# Patient Record
Sex: FEMALE | Race: WHITE | Employment: FULL TIME | URBAN - METROPOLITAN AREA
[De-identification: names, ages, dates, MRNs, and addresses within clinical notes are randomized per-mention and may not be internally consistent; named-entity substitution may affect disease eponyms.]

---

## 2017-01-16 ENCOUNTER — OFFICE VISIT (OUTPATIENT)
Dept: FAMILY MEDICINE CLINIC | Age: 32
End: 2017-01-16

## 2017-01-16 VITALS
HEART RATE: 97 BPM | TEMPERATURE: 98.5 F | WEIGHT: 256 LBS | SYSTOLIC BLOOD PRESSURE: 134 MMHG | BODY MASS INDEX: 42.65 KG/M2 | HEIGHT: 65 IN | OXYGEN SATURATION: 98 % | DIASTOLIC BLOOD PRESSURE: 80 MMHG | RESPIRATION RATE: 16 BRPM

## 2017-01-16 DIAGNOSIS — Z01.818 PREOP GENERAL PHYSICAL EXAM: ICD-10-CM

## 2017-01-16 DIAGNOSIS — E55.9 VITAMIN D DEFICIENCY: ICD-10-CM

## 2017-01-16 DIAGNOSIS — K52.831 COLITIS, COLLAGENOUS: ICD-10-CM

## 2017-01-16 DIAGNOSIS — Z87.19 HISTORY OF PANCREATITIS: ICD-10-CM

## 2017-01-16 DIAGNOSIS — D25.9 UTERINE LEIOMYOMA, UNSPECIFIED LOCATION: Primary | ICD-10-CM

## 2017-01-16 LAB
A/G RATIO: 1.4 (ref 1.1–2.2)
ALBUMIN SERPL-MCNC: 3.8 G/DL (ref 3.4–5)
ALP BLD-CCNC: 71 U/L (ref 40–129)
ALT SERPL-CCNC: 10 U/L (ref 10–40)
ANION GAP SERPL CALCULATED.3IONS-SCNC: 14 MMOL/L (ref 3–16)
AST SERPL-CCNC: 13 U/L (ref 15–37)
BASOPHILS ABSOLUTE: 0.1 K/UL (ref 0–0.2)
BASOPHILS RELATIVE PERCENT: 0.9 %
BILIRUB SERPL-MCNC: 1 MG/DL (ref 0–1)
BUN BLDV-MCNC: 9 MG/DL (ref 7–20)
CALCIUM SERPL-MCNC: 9 MG/DL (ref 8.3–10.6)
CHLORIDE BLD-SCNC: 106 MMOL/L (ref 99–110)
CO2: 22 MMOL/L (ref 21–32)
CREAT SERPL-MCNC: 0.6 MG/DL (ref 0.6–1.1)
EOSINOPHILS ABSOLUTE: 0.1 K/UL (ref 0–0.6)
EOSINOPHILS RELATIVE PERCENT: 1.4 %
GFR AFRICAN AMERICAN: >60
GFR NON-AFRICAN AMERICAN: >60
GLOBULIN: 2.7 G/DL
GLUCOSE BLD-MCNC: 91 MG/DL (ref 70–99)
HCT VFR BLD CALC: 41.4 % (ref 36–48)
HEMOGLOBIN: 13.9 G/DL (ref 12–16)
LIPASE: 44 U/L (ref 13–60)
LYMPHOCYTES ABSOLUTE: 2.4 K/UL (ref 1–5.1)
LYMPHOCYTES RELATIVE PERCENT: 33.3 %
MCH RBC QN AUTO: 29.9 PG (ref 26–34)
MCHC RBC AUTO-ENTMCNC: 33.5 G/DL (ref 31–36)
MCV RBC AUTO: 89.2 FL (ref 80–100)
MONOCYTES ABSOLUTE: 0.5 K/UL (ref 0–1.3)
MONOCYTES RELATIVE PERCENT: 7.3 %
NEUTROPHILS ABSOLUTE: 4.1 K/UL (ref 1.7–7.7)
NEUTROPHILS RELATIVE PERCENT: 57.1 %
PDW BLD-RTO: 13.1 % (ref 12.4–15.4)
PLATELET # BLD: 260 K/UL (ref 135–450)
PMV BLD AUTO: 9.4 FL (ref 5–10.5)
POTASSIUM SERPL-SCNC: 3.9 MMOL/L (ref 3.5–5.1)
RBC # BLD: 4.64 M/UL (ref 4–5.2)
SODIUM BLD-SCNC: 142 MMOL/L (ref 136–145)
TOTAL PROTEIN: 6.5 G/DL (ref 6.4–8.2)
VITAMIN D 25-HYDROXY: 37.4 NG/ML
WBC # BLD: 7.2 K/UL (ref 4–11)

## 2017-01-16 PROCEDURE — 99242 OFF/OP CONSLTJ NEW/EST SF 20: CPT | Performed by: FAMILY MEDICINE

## 2017-01-16 PROCEDURE — 36415 COLL VENOUS BLD VENIPUNCTURE: CPT | Performed by: FAMILY MEDICINE

## 2017-01-16 RX ORDER — BUDESONIDE 3 MG/1
3 CAPSULE, COATED PELLETS ORAL DAILY
COMMUNITY
Start: 2017-01-01 | End: 2019-03-05 | Stop reason: ALTCHOICE

## 2017-01-16 RX ORDER — ACETAMINOPHEN 325 MG/1
650 TABLET ORAL EVERY 6 HOURS PRN
COMMUNITY
End: 2018-02-28 | Stop reason: ALTCHOICE

## 2017-02-01 ENCOUNTER — OFFICE VISIT (OUTPATIENT)
Dept: FAMILY MEDICINE CLINIC | Age: 32
End: 2017-02-01

## 2017-02-01 VITALS
WEIGHT: 259 LBS | RESPIRATION RATE: 16 BRPM | TEMPERATURE: 98.8 F | HEIGHT: 65 IN | HEART RATE: 133 BPM | OXYGEN SATURATION: 98 % | DIASTOLIC BLOOD PRESSURE: 80 MMHG | SYSTOLIC BLOOD PRESSURE: 132 MMHG | BODY MASS INDEX: 43.15 KG/M2

## 2017-02-01 DIAGNOSIS — R05.9 COUGH: Primary | ICD-10-CM

## 2017-02-01 PROCEDURE — 99212 OFFICE O/P EST SF 10 MIN: CPT | Performed by: NURSE PRACTITIONER

## 2017-02-01 RX ORDER — OXYCODONE HYDROCHLORIDE 5 MG/1
5-10 TABLET ORAL
COMMUNITY
Start: 2017-01-28 | End: 2018-02-28 | Stop reason: ALTCHOICE

## 2017-02-01 RX ORDER — TRAMADOL HYDROCHLORIDE 50 MG/1
50-100 TABLET ORAL
COMMUNITY
Start: 2017-01-28 | End: 2018-02-28 | Stop reason: ALTCHOICE

## 2017-02-01 ASSESSMENT — ENCOUNTER SYMPTOMS: COUGH: 1

## 2017-02-08 ENCOUNTER — OFFICE VISIT (OUTPATIENT)
Dept: FAMILY MEDICINE CLINIC | Age: 32
End: 2017-02-08

## 2017-02-08 VITALS
RESPIRATION RATE: 16 BRPM | OXYGEN SATURATION: 98 % | SYSTOLIC BLOOD PRESSURE: 132 MMHG | BODY MASS INDEX: 42.75 KG/M2 | HEART RATE: 102 BPM | WEIGHT: 256.6 LBS | DIASTOLIC BLOOD PRESSURE: 80 MMHG | HEIGHT: 65 IN | TEMPERATURE: 98.2 F

## 2017-02-08 DIAGNOSIS — J18.9 PNEUMONIA OF BOTH LOWER LOBES DUE TO INFECTIOUS ORGANISM: ICD-10-CM

## 2017-02-08 DIAGNOSIS — R30.0 DYSURIA: Primary | ICD-10-CM

## 2017-02-08 LAB
BILIRUBIN, POC: NEGATIVE
BLOOD URINE, POC: ABNORMAL
CLARITY, POC: ABNORMAL
COLOR, POC: ABNORMAL
GLUCOSE URINE, POC: NEGATIVE
KETONES, POC: NEGATIVE
LEUKOCYTE EST, POC: NEGATIVE
NITRITE, POC: NEGATIVE
PH, POC: 6.5
PROTEIN, POC: NEGATIVE
SPECIFIC GRAVITY, POC: 1.02
UROBILINOGEN, POC: 0.2

## 2017-02-08 PROCEDURE — 81002 URINALYSIS NONAUTO W/O SCOPE: CPT | Performed by: NURSE PRACTITIONER

## 2017-02-08 PROCEDURE — 99213 OFFICE O/P EST LOW 20 MIN: CPT | Performed by: NURSE PRACTITIONER

## 2017-02-10 LAB — URINE CULTURE, ROUTINE: NORMAL

## 2017-07-14 DIAGNOSIS — K52.831 COLITIS, COLLAGENOUS: ICD-10-CM

## 2017-07-14 DIAGNOSIS — E55.9 VITAMIN D DEFICIENCY: Primary | ICD-10-CM

## 2017-07-14 DIAGNOSIS — Z79.899 LONG TERM USE OF DRUG: ICD-10-CM

## 2017-07-17 ENCOUNTER — NURSE ONLY (OUTPATIENT)
Dept: FAMILY MEDICINE CLINIC | Age: 32
End: 2017-07-17

## 2017-07-17 DIAGNOSIS — Z79.899 LONG TERM USE OF DRUG: ICD-10-CM

## 2017-07-17 DIAGNOSIS — K52.831 COLITIS, COLLAGENOUS: ICD-10-CM

## 2017-07-17 DIAGNOSIS — E55.9 VITAMIN D DEFICIENCY: ICD-10-CM

## 2017-07-17 LAB
A/G RATIO: 1.4 (ref 1.1–2.2)
ALBUMIN SERPL-MCNC: 3.9 G/DL (ref 3.4–5)
ALP BLD-CCNC: 89 U/L (ref 40–129)
ALT SERPL-CCNC: 13 U/L (ref 10–40)
ANION GAP SERPL CALCULATED.3IONS-SCNC: 16 MMOL/L (ref 3–16)
AST SERPL-CCNC: 12 U/L (ref 15–37)
BASOPHILS ABSOLUTE: 0.1 K/UL (ref 0–0.2)
BASOPHILS RELATIVE PERCENT: 0.7 %
BILIRUB SERPL-MCNC: 0.6 MG/DL (ref 0–1)
BUN BLDV-MCNC: 10 MG/DL (ref 7–20)
CALCIUM SERPL-MCNC: 8.9 MG/DL (ref 8.3–10.6)
CHLORIDE BLD-SCNC: 102 MMOL/L (ref 99–110)
CO2: 22 MMOL/L (ref 21–32)
CREAT SERPL-MCNC: 0.6 MG/DL (ref 0.6–1.1)
EOSINOPHILS ABSOLUTE: 0.1 K/UL (ref 0–0.6)
EOSINOPHILS RELATIVE PERCENT: 1.2 %
GFR AFRICAN AMERICAN: >60
GFR NON-AFRICAN AMERICAN: >60
GLOBULIN: 2.8 G/DL
GLUCOSE BLD-MCNC: 107 MG/DL (ref 70–99)
HCT VFR BLD CALC: 39.9 % (ref 36–48)
HEMOGLOBIN: 13.2 G/DL (ref 12–16)
LYMPHOCYTES ABSOLUTE: 2.9 K/UL (ref 1–5.1)
LYMPHOCYTES RELATIVE PERCENT: 26.8 %
MCH RBC QN AUTO: 28.4 PG (ref 26–34)
MCHC RBC AUTO-ENTMCNC: 33 G/DL (ref 31–36)
MCV RBC AUTO: 86.1 FL (ref 80–100)
MONOCYTES ABSOLUTE: 0.5 K/UL (ref 0–1.3)
MONOCYTES RELATIVE PERCENT: 4.5 %
NEUTROPHILS ABSOLUTE: 7.1 K/UL (ref 1.7–7.7)
NEUTROPHILS RELATIVE PERCENT: 66.8 %
PDW BLD-RTO: 14.4 % (ref 12.4–15.4)
PLATELET # BLD: 299 K/UL (ref 135–450)
PMV BLD AUTO: 8.8 FL (ref 5–10.5)
POTASSIUM SERPL-SCNC: 3.7 MMOL/L (ref 3.5–5.1)
RBC # BLD: 4.64 M/UL (ref 4–5.2)
SODIUM BLD-SCNC: 140 MMOL/L (ref 136–145)
TOTAL PROTEIN: 6.7 G/DL (ref 6.4–8.2)
VITAMIN D 25-HYDROXY: 32.5 NG/ML
WBC # BLD: 10.7 K/UL (ref 4–11)

## 2017-07-17 PROCEDURE — 36415 COLL VENOUS BLD VENIPUNCTURE: CPT | Performed by: FAMILY MEDICINE

## 2017-11-22 ENCOUNTER — HOSPITAL ENCOUNTER (OUTPATIENT)
Dept: OTHER | Age: 32
Discharge: HOME | End: 2017-11-22
Attending: INTERNAL MEDICINE | Admitting: INTERNAL MEDICINE

## 2017-11-22 LAB
ANION GAP SERPL CALCULATED.3IONS-SCNC: 14 MMOL/L (ref 3–16)
BASOPHILS ABSOLUTE: 0.1 K/UL (ref 0–0.2)
BASOPHILS RELATIVE PERCENT: 0.5 %
BUN BLDV-MCNC: 7 MG/DL (ref 7–20)
CALCIUM SERPL-MCNC: 8.9 MG/DL (ref 8.3–10.6)
CHLORIDE BLD-SCNC: 102 MMOL/L (ref 99–110)
CO2: 22 MMOL/L (ref 21–32)
CREAT SERPL-MCNC: 0.6 MG/DL (ref 0.6–1.1)
EOSINOPHILS ABSOLUTE: 0.2 K/UL (ref 0–0.6)
EOSINOPHILS RELATIVE PERCENT: 1.9 %
GFR AFRICAN AMERICAN: >60
GFR NON-AFRICAN AMERICAN: >60
GLUCOSE BLD-MCNC: 90 MG/DL (ref 70–99)
HCT VFR BLD CALC: 39.1 % (ref 36–48)
HEMOGLOBIN: 13.1 G/DL (ref 12–16)
LYMPHOCYTES ABSOLUTE: 2.7 K/UL (ref 1–5.1)
LYMPHOCYTES RELATIVE PERCENT: 27.3 %
MCH RBC QN AUTO: 28.6 PG (ref 26–34)
MCHC RBC AUTO-ENTMCNC: 33.4 G/DL (ref 31–36)
MCV RBC AUTO: 85.5 FL (ref 80–100)
MONOCYTES ABSOLUTE: 0.7 K/UL (ref 0–1.3)
MONOCYTES RELATIVE PERCENT: 7 %
NEUTROPHILS ABSOLUTE: 6.2 K/UL (ref 1.7–7.7)
NEUTROPHILS RELATIVE PERCENT: 63.3 %
PDW BLD-RTO: 13.5 % (ref 12.4–15.4)
PLATELET # BLD: 298 K/UL (ref 135–450)
PMV BLD AUTO: 9.1 FL (ref 5–10.5)
POTASSIUM SERPL-SCNC: 3.5 MMOL/L (ref 3.5–5.1)
RBC # BLD: 4.57 M/UL (ref 4–5.2)
SODIUM BLD-SCNC: 138 MMOL/L (ref 136–145)
WBC # BLD: 9.8 K/UL (ref 4–11)

## 2017-11-25 ENCOUNTER — HOSPITAL ENCOUNTER (OUTPATIENT)
Dept: OTHER | Age: 32
Discharge: HOME | End: 2017-11-25
Attending: INTERNAL MEDICINE | Admitting: INTERNAL MEDICINE

## 2017-11-25 LAB — C DIFFICILE TOXIN, EIA: NORMAL

## 2017-11-26 LAB — GI BACTERIAL PATHOGENS BY PCR: NORMAL

## 2017-11-27 LAB
CRYPTOSPORIDIUM ANTIGEN STOOL: NORMAL
E HISTOLYTICA ANTIGEN STOOL: NORMAL
GIARDIA ANTIGEN STOOL: NORMAL

## 2017-11-28 ENCOUNTER — TELEPHONE (OUTPATIENT)
Dept: FAMILY MEDICINE CLINIC | Age: 32
End: 2017-11-28

## 2017-11-29 ENCOUNTER — OFFICE VISIT (OUTPATIENT)
Dept: FAMILY MEDICINE CLINIC | Age: 32
End: 2017-11-29

## 2017-11-29 VITALS
HEART RATE: 76 BPM | HEIGHT: 65 IN | WEIGHT: 281 LBS | DIASTOLIC BLOOD PRESSURE: 78 MMHG | RESPIRATION RATE: 16 BRPM | SYSTOLIC BLOOD PRESSURE: 124 MMHG | BODY MASS INDEX: 46.82 KG/M2

## 2017-11-29 DIAGNOSIS — R11.0 NAUSEA: ICD-10-CM

## 2017-11-29 DIAGNOSIS — Z20.2 EXPOSURE TO SEXUALLY TRANSMITTED DISEASE (STD): Primary | ICD-10-CM

## 2017-11-29 PROCEDURE — 99213 OFFICE O/P EST LOW 20 MIN: CPT | Performed by: FAMILY MEDICINE

## 2017-11-29 RX ORDER — MIRTAZAPINE 15 MG/1
TABLET, FILM COATED ORAL
COMMUNITY
Start: 2017-11-16 | End: 2019-03-05 | Stop reason: ALTCHOICE

## 2017-11-29 RX ORDER — ONDANSETRON 4 MG/1
4 TABLET, ORALLY DISINTEGRATING ORAL EVERY 8 HOURS PRN
Qty: 20 TABLET | Refills: 0 | Status: SHIPPED | OUTPATIENT
Start: 2017-11-29 | End: 2019-03-05

## 2017-11-29 RX ORDER — BUSPIRONE HYDROCHLORIDE 15 MG/1
TABLET ORAL
COMMUNITY
Start: 2017-10-31 | End: 2019-03-05 | Stop reason: ALTCHOICE

## 2017-11-29 NOTE — PROGRESS NOTES
mood and affect. Assessment:      1. Exposure to sexually transmitted disease (STD)  C.trachomatis N.gonorrhoeae DNA, Urine    Vaginal Pathogens Probes *A    HIV Screen    RPR Reflex to Titer and TPPA    HIV Screen    RPR Reflex to Titer and TPPA   2.  Nausea             Plan:      Reviewed results from GI w/ pt  Refill zofran odt prn  F/u psychiatrist/ counselor  D/w pt - let me  Know if I can do anything to help  Check hiv/ rpr - blood  D/w pt hold on testing for hsv/ hpv - sx d/ w pt  Vaginal pathogen, gc/ chlamydia testing done  Hold on tx pending results    Routine pap encouraged

## 2017-11-30 LAB
C TRACH DNA GENITAL QL NAA+PROBE: NEGATIVE
CANDIDA SPECIES, DNA PROBE: NORMAL
GARDNERELLA VAGINALIS, DNA PROBE: NORMAL
HIV-1 AND HIV-2 ANTIBODIES: NORMAL
N. GONORRHOEAE DNA: NEGATIVE
RPR: NORMAL
TRICHOMONAS VAGINALIS DNA: NORMAL

## 2018-01-30 RX ORDER — DULOXETIN HYDROCHLORIDE 60 MG/1
CAPSULE, DELAYED RELEASE ORAL
Qty: 90 CAPSULE | Refills: 1 | OUTPATIENT
Start: 2018-01-30

## 2018-02-28 ENCOUNTER — OFFICE VISIT (OUTPATIENT)
Dept: FAMILY MEDICINE CLINIC | Age: 33
End: 2018-02-28

## 2018-02-28 VITALS
HEIGHT: 65 IN | RESPIRATION RATE: 14 BRPM | BODY MASS INDEX: 47.62 KG/M2 | SYSTOLIC BLOOD PRESSURE: 122 MMHG | WEIGHT: 285.8 LBS | HEART RATE: 72 BPM | DIASTOLIC BLOOD PRESSURE: 80 MMHG

## 2018-02-28 DIAGNOSIS — Z13.220 LIPID SCREENING: ICD-10-CM

## 2018-02-28 DIAGNOSIS — E55.9 VITAMIN D DEFICIENCY: ICD-10-CM

## 2018-02-28 DIAGNOSIS — F43.23 ADJUSTMENT DISORDER WITH MIXED ANXIETY AND DEPRESSED MOOD: ICD-10-CM

## 2018-02-28 DIAGNOSIS — Z00.00 WELL ADULT EXAM: Primary | ICD-10-CM

## 2018-02-28 DIAGNOSIS — J45.20 MILD INTERMITTENT ASTHMA WITHOUT COMPLICATION: ICD-10-CM

## 2018-02-28 DIAGNOSIS — K52.831 COLITIS, COLLAGENOUS: ICD-10-CM

## 2018-02-28 LAB
A/G RATIO: 1.6 (ref 1.1–2.2)
ALBUMIN SERPL-MCNC: 4.1 G/DL (ref 3.4–5)
ALP BLD-CCNC: 87 U/L (ref 40–129)
ALT SERPL-CCNC: 10 U/L (ref 10–40)
ANION GAP SERPL CALCULATED.3IONS-SCNC: 14 MMOL/L (ref 3–16)
AST SERPL-CCNC: 12 U/L (ref 15–37)
BASOPHILS ABSOLUTE: 0.1 K/UL (ref 0–0.2)
BASOPHILS RELATIVE PERCENT: 1 %
BILIRUB SERPL-MCNC: 0.9 MG/DL (ref 0–1)
BUN BLDV-MCNC: 7 MG/DL (ref 7–20)
C-REACTIVE PROTEIN: 8.5 MG/L (ref 0–5.1)
CALCIUM SERPL-MCNC: 8.4 MG/DL (ref 8.3–10.6)
CHLORIDE BLD-SCNC: 102 MMOL/L (ref 99–110)
CHOLESTEROL, TOTAL: 183 MG/DL (ref 0–199)
CO2: 23 MMOL/L (ref 21–32)
CREAT SERPL-MCNC: 0.6 MG/DL (ref 0.6–1.1)
EOSINOPHILS ABSOLUTE: 0.1 K/UL (ref 0–0.6)
EOSINOPHILS RELATIVE PERCENT: 0.8 %
GFR AFRICAN AMERICAN: >60
GFR NON-AFRICAN AMERICAN: >60
GLOBULIN: 2.6 G/DL
GLUCOSE BLD-MCNC: 81 MG/DL (ref 70–99)
HCT VFR BLD CALC: 37.1 % (ref 36–48)
HDLC SERPL-MCNC: 54 MG/DL (ref 40–60)
HEMOGLOBIN: 12.6 G/DL (ref 12–16)
LDL CHOLESTEROL CALCULATED: 116 MG/DL
LYMPHOCYTES ABSOLUTE: 2 K/UL (ref 1–5.1)
LYMPHOCYTES RELATIVE PERCENT: 23.8 %
MCH RBC QN AUTO: 28.6 PG (ref 26–34)
MCHC RBC AUTO-ENTMCNC: 34 G/DL (ref 31–36)
MCV RBC AUTO: 84.1 FL (ref 80–100)
MONOCYTES ABSOLUTE: 0.4 K/UL (ref 0–1.3)
MONOCYTES RELATIVE PERCENT: 5 %
NEUTROPHILS ABSOLUTE: 5.8 K/UL (ref 1.7–7.7)
NEUTROPHILS RELATIVE PERCENT: 69.4 %
PDW BLD-RTO: 14.2 % (ref 12.4–15.4)
PLATELET # BLD: 268 K/UL (ref 135–450)
PMV BLD AUTO: 9.6 FL (ref 5–10.5)
POTASSIUM SERPL-SCNC: 4.1 MMOL/L (ref 3.5–5.1)
RBC # BLD: 4.41 M/UL (ref 4–5.2)
SODIUM BLD-SCNC: 139 MMOL/L (ref 136–145)
TOTAL PROTEIN: 6.7 G/DL (ref 6.4–8.2)
TRIGL SERPL-MCNC: 67 MG/DL (ref 0–150)
VITAMIN D 25-HYDROXY: 33.6 NG/ML
VLDLC SERPL CALC-MCNC: 13 MG/DL
WBC # BLD: 8.3 K/UL (ref 4–11)

## 2018-02-28 PROCEDURE — 36415 COLL VENOUS BLD VENIPUNCTURE: CPT | Performed by: FAMILY MEDICINE

## 2018-02-28 PROCEDURE — 99395 PREV VISIT EST AGE 18-39: CPT | Performed by: FAMILY MEDICINE

## 2018-02-28 PROCEDURE — G0444 DEPRESSION SCREEN ANNUAL: HCPCS | Performed by: FAMILY MEDICINE

## 2018-02-28 ASSESSMENT — PATIENT HEALTH QUESTIONNAIRE - PHQ9
1. LITTLE INTEREST OR PLEASURE IN DOING THINGS: 0
3. TROUBLE FALLING OR STAYING ASLEEP: 0
9. THOUGHTS THAT YOU WOULD BE BETTER OFF DEAD, OR OF HURTING YOURSELF: 0
SUM OF ALL RESPONSES TO PHQ9 QUESTIONS 1 & 2: 3
SUM OF ALL RESPONSES TO PHQ QUESTIONS 1-9: 4
5. POOR APPETITE OR OVEREATING: 0
7. TROUBLE CONCENTRATING ON THINGS, SUCH AS READING THE NEWSPAPER OR WATCHING TELEVISION: 0
8. MOVING OR SPEAKING SO SLOWLY THAT OTHER PEOPLE COULD HAVE NOTICED. OR THE OPPOSITE, BEING SO FIGETY OR RESTLESS THAT YOU HAVE BEEN MOVING AROUND A LOT MORE THAN USUAL: 0
10. IF YOU CHECKED OFF ANY PROBLEMS, HOW DIFFICULT HAVE THESE PROBLEMS MADE IT FOR YOU TO DO YOUR WORK, TAKE CARE OF THINGS AT HOME, OR GET ALONG WITH OTHER PEOPLE: 1
4. FEELING TIRED OR HAVING LITTLE ENERGY: 0
2. FEELING DOWN, DEPRESSED OR HOPELESS: 3
6. FEELING BAD ABOUT YOURSELF - OR THAT YOU ARE A FAILURE OR HAVE LET YOURSELF OR YOUR FAMILY DOWN: 1

## 2019-03-05 ENCOUNTER — OFFICE VISIT (OUTPATIENT)
Dept: FAMILY MEDICINE CLINIC | Age: 34
End: 2019-03-05
Payer: COMMERCIAL

## 2019-03-05 VITALS
RESPIRATION RATE: 16 BRPM | HEART RATE: 84 BPM | TEMPERATURE: 98 F | DIASTOLIC BLOOD PRESSURE: 74 MMHG | HEIGHT: 65 IN | WEIGHT: 244 LBS | BODY MASS INDEX: 40.65 KG/M2 | SYSTOLIC BLOOD PRESSURE: 110 MMHG

## 2019-03-05 DIAGNOSIS — Z00.00 WELL ADULT EXAM: Primary | ICD-10-CM

## 2019-03-05 DIAGNOSIS — E78.00 HYPERCHOLESTEREMIA: ICD-10-CM

## 2019-03-05 LAB
A/G RATIO: 1.6 (ref 1.1–2.2)
ALBUMIN SERPL-MCNC: 4.4 G/DL (ref 3.4–5)
ALP BLD-CCNC: 73 U/L (ref 40–129)
ALT SERPL-CCNC: 9 U/L (ref 10–40)
ANION GAP SERPL CALCULATED.3IONS-SCNC: 16 MMOL/L (ref 3–16)
AST SERPL-CCNC: 13 U/L (ref 15–37)
BILIRUB SERPL-MCNC: 1 MG/DL (ref 0–1)
BUN BLDV-MCNC: 5 MG/DL (ref 7–20)
CALCIUM SERPL-MCNC: 9.3 MG/DL (ref 8.3–10.6)
CHLORIDE BLD-SCNC: 102 MMOL/L (ref 99–110)
CHOLESTEROL, TOTAL: 173 MG/DL (ref 0–199)
CO2: 23 MMOL/L (ref 21–32)
CREAT SERPL-MCNC: <0.5 MG/DL (ref 0.6–1.1)
GFR AFRICAN AMERICAN: >60
GFR NON-AFRICAN AMERICAN: >60
GLOBULIN: 2.7 G/DL
GLUCOSE BLD-MCNC: 82 MG/DL (ref 70–99)
HDLC SERPL-MCNC: 53 MG/DL (ref 40–60)
LDL CHOLESTEROL CALCULATED: 105 MG/DL
POTASSIUM SERPL-SCNC: 4.1 MMOL/L (ref 3.5–5.1)
SODIUM BLD-SCNC: 141 MMOL/L (ref 136–145)
TOTAL PROTEIN: 7.1 G/DL (ref 6.4–8.2)
TRIGL SERPL-MCNC: 73 MG/DL (ref 0–150)
VLDLC SERPL CALC-MCNC: 15 MG/DL

## 2019-03-05 PROCEDURE — 36415 COLL VENOUS BLD VENIPUNCTURE: CPT | Performed by: FAMILY MEDICINE

## 2019-03-05 PROCEDURE — 99395 PREV VISIT EST AGE 18-39: CPT | Performed by: FAMILY MEDICINE

## 2019-03-05 ASSESSMENT — PATIENT HEALTH QUESTIONNAIRE - PHQ9
SUM OF ALL RESPONSES TO PHQ QUESTIONS 1-9: 0
SUM OF ALL RESPONSES TO PHQ9 QUESTIONS 1 & 2: 0
1. LITTLE INTEREST OR PLEASURE IN DOING THINGS: 0
SUM OF ALL RESPONSES TO PHQ QUESTIONS 1-9: 0
2. FEELING DOWN, DEPRESSED OR HOPELESS: 0

## 2019-06-30 ENCOUNTER — PATIENT MESSAGE (OUTPATIENT)
Dept: FAMILY MEDICINE CLINIC | Age: 34
End: 2019-06-30

## 2019-07-08 ENCOUNTER — OFFICE VISIT (OUTPATIENT)
Dept: FAMILY MEDICINE CLINIC | Age: 34
End: 2019-07-08
Payer: COMMERCIAL

## 2019-07-08 VITALS
SYSTOLIC BLOOD PRESSURE: 112 MMHG | HEIGHT: 65 IN | WEIGHT: 255.4 LBS | BODY MASS INDEX: 42.55 KG/M2 | OXYGEN SATURATION: 100 % | DIASTOLIC BLOOD PRESSURE: 62 MMHG | HEART RATE: 70 BPM

## 2019-07-08 DIAGNOSIS — M54.16 LUMBAR BACK PAIN WITH RADICULOPATHY AFFECTING RIGHT LOWER EXTREMITY: Primary | ICD-10-CM

## 2019-07-08 PROCEDURE — 99213 OFFICE O/P EST LOW 20 MIN: CPT | Performed by: NURSE PRACTITIONER

## 2019-07-08 PROCEDURE — 96372 THER/PROPH/DIAG INJ SC/IM: CPT | Performed by: NURSE PRACTITIONER

## 2019-07-08 RX ORDER — PREDNISONE 10 MG/1
TABLET ORAL
Qty: 36 TABLET | Refills: 0 | Status: SHIPPED | OUTPATIENT
Start: 2019-07-08 | End: 2019-10-21 | Stop reason: SDUPTHER

## 2019-07-08 RX ORDER — METHOCARBAMOL 500 MG/1
1000 TABLET, FILM COATED ORAL 2 TIMES DAILY
COMMUNITY
Start: 2019-07-05 | End: 2019-07-08 | Stop reason: ALTCHOICE

## 2019-07-08 RX ORDER — HYDROCODONE BITARTRATE AND ACETAMINOPHEN 5; 325 MG/1; MG/1
1 TABLET ORAL EVERY 4 HOURS PRN
Qty: 30 TABLET | Refills: 0 | Status: SHIPPED | OUTPATIENT
Start: 2019-07-08 | End: 2019-07-13

## 2019-07-08 RX ORDER — TIZANIDINE 4 MG/1
4 TABLET ORAL EVERY 6 HOURS PRN
Qty: 40 TABLET | Refills: 0 | Status: SHIPPED | OUTPATIENT
Start: 2019-07-08 | End: 2020-11-12 | Stop reason: ALTCHOICE

## 2019-07-08 NOTE — PROGRESS NOTES
Zamzam Greene  26 y.o. female    1985      CC: Back pain     Chief Complaint   Patient presents with    Back Pain     BACK PAIN FOR WEEKS AND HAS BEEN SEEING A CHIROPRACTOR. LAST WED, PAIN GOT WORSE AND WENT TO THE ER ON FRIDAY. HPI    3 weeks ago did yard work and was ok. Then started having back pain. Went to [facility] and helped. Was doing better and walking. Then started exercises and had pain radiating to the legs. Could not move. Spasms all over. Went to ER Friday. Chiro was closed. Has MRI scheduled for tonight. Had xray at ER and they gave muscle relaxers and NSAIDs (which she tries to avoid due to colitis flares). Spasms are so bad that could barely get up this am.    Awilda Diallo wants to see her BID for a week. Wants her on pain med and a stronger anti-inflammatory. Had Morphine, muscle relaxer and shot. Some short term relief hours later. On robaxin. Not helping much. Pain ebbs and flows    Low back pain to the right leg to the knee. At time the pain to below the knee. Xray normal.    Tried ice and TENS. Allergies   Allergen Reactions    Nsaids      Other reaction(s): Other (See Comments)  Colitis flares up    Eluxadoline Nausea And Vomiting     Pancreatitis    Other Nausea And Vomiting     vibrezi       Physical  Examination    Physical Exam   Constitutional: She is oriented to person, place, and time. She appears distressed (due to back pain). Not able to move from sit to stand easily. Miserable. Not able to lift right leg. Cardiovascular: Normal rate. Pulmonary/Chest: Effort normal.   Musculoskeletal: She exhibits tenderness. Spasm of the entire right lower back . Tender bilateral lower back area and into the right mid back to shoulder blades. Neurological: She is alert and oriented to person, place, and time. Skin: Skin is warm and dry. She is not diaphoretic. No erythema. Psychiatric:   Distressed due to pain. Very uncomfortable.     Nursing

## 2019-07-08 NOTE — PROGRESS NOTES
Administrations This Visit     hydrocortisone sodium succinate PF (SOLU-CORTEF) injection 100 mg     Admin Date  07/08/2019  13:47 Action  Given Dose  100 mg Route  Intramuscular Site  Ventrogluteal Left  Administered By  Tiesha Veras MA    Ordering Provider:  SHIRIN Phillips CNP    NDC:  8319-8703-60    Lot#:  [id number]    :  Nicolas Welsh.     Patient Supplied?:  No    Comments:  SITE: [facility]# [id number]# [id number] DATE: 10/2021VERIFIED BY: [facility]

## 2019-10-21 ENCOUNTER — TELEPHONE (OUTPATIENT)
Dept: FAMILY MEDICINE CLINIC | Age: 34
End: 2019-10-21

## 2019-10-21 RX ORDER — PREDNISONE 10 MG/1
TABLET ORAL
Qty: 36 TABLET | Refills: 0 | Status: SHIPPED | OUTPATIENT
Start: 2019-10-21 | End: 2020-11-12 | Stop reason: ALTCHOICE

## 2020-06-19 ENCOUNTER — PATIENT MESSAGE (OUTPATIENT)
Dept: FAMILY MEDICINE CLINIC | Age: 35
End: 2020-06-19

## 2020-06-19 NOTE — TELEPHONE ENCOUNTER
From: Diego Longo  To: Cony Shine MD  Sent: 6/19/2020 4:42 AM EDT  Subject: Visit Follow-Up Question    Wanted to let ypu know so you can update my record.  I had my pap smear May 19th with Dr. Vivi Blanco

## 2020-11-05 ENCOUNTER — PATIENT MESSAGE (OUTPATIENT)
Dept: FAMILY MEDICINE CLINIC | Age: 35
End: 2020-11-05

## 2020-11-05 RX ORDER — METHOCARBAMOL 750 MG/1
750 TABLET, FILM COATED ORAL EVERY 6 HOURS PRN
Qty: 40 TABLET | Refills: 0 | Status: SHIPPED | OUTPATIENT
Start: 2020-11-05 | End: 2020-11-15

## 2020-11-11 ENCOUNTER — TELEPHONE (OUTPATIENT)
Dept: FAMILY MEDICINE CLINIC | Age: 35
End: 2020-11-11

## 2020-11-11 NOTE — TELEPHONE ENCOUNTER
Please add her on tomorrow in virtual slot or if she prefers to be examined, see if can put on as acute for someone in office.   I don't know why a muscle relaxant would make pain worse? - use moist heat  in meantime if still feeling spasm

## 2020-11-11 NOTE — TELEPHONE ENCOUNTER
DR. Dario Blacno CALLED IN A MUSCLE RELAXER FOR HER BACK AND  IT GOT WORSE -  DO YOU NEED TO SEE HER OR CALL IN SOMETHING ELSE ? -    SHE HAD AN APPT FOR A SPECIALIST FOR TOMORROW AND THEY CALLED AND CANCELLED IT       PT @  [phone]

## 2020-11-11 NOTE — TELEPHONE ENCOUNTER
CALLED AND SPOKE TO PATIENT. SHE IS WILLING TO DO A VIRTUAL APPT TOMORROW WITH DR. SRIVASTAVA. SHE CAN DO THE 1140AM TOMORROW.  PUT INFORMATION ON [facility] TO MAKE THIS APPT IN THE AM FOR ME. [place]

## 2020-11-12 ENCOUNTER — VIRTUAL VISIT (OUTPATIENT)
Dept: FAMILY MEDICINE CLINIC | Age: 35
End: 2020-11-12
Payer: COMMERCIAL

## 2020-11-12 PROCEDURE — 99213 OFFICE O/P EST LOW 20 MIN: CPT | Performed by: FAMILY MEDICINE

## 2020-11-12 RX ORDER — PREDNISONE 20 MG/1
TABLET ORAL
Qty: 16 TABLET | Refills: 0 | Status: SHIPPED | OUTPATIENT
Start: 2020-11-12 | End: 2021-02-26

## 2020-11-12 RX ORDER — NAPROXEN 500 MG/1
500 TABLET ORAL 2 TIMES DAILY WITH MEALS
COMMUNITY
End: 2020-12-08

## 2020-11-12 RX ORDER — HYDROCODONE BITARTRATE AND ACETAMINOPHEN 5; 325 MG/1; MG/1
1-2 TABLET ORAL EVERY 6 HOURS PRN
Qty: 20 TABLET | Refills: 0 | Status: SHIPPED | OUTPATIENT
Start: 2020-11-12 | End: 2020-11-17

## 2020-11-12 RX ORDER — CYCLOBENZAPRINE HCL 10 MG
10 TABLET ORAL 3 TIMES DAILY PRN
Qty: 30 TABLET | Refills: 0 | Status: SHIPPED | OUTPATIENT
Start: 2020-11-12 | End: 2020-11-22

## 2020-11-12 ASSESSMENT — PATIENT HEALTH QUESTIONNAIRE - PHQ9
1. LITTLE INTEREST OR PLEASURE IN DOING THINGS: 0
SUM OF ALL RESPONSES TO PHQ QUESTIONS 1-9: 0
2. FEELING DOWN, DEPRESSED OR HOPELESS: 0
SUM OF ALL RESPONSES TO PHQ9 QUESTIONS 1 & 2: 0

## 2020-11-12 NOTE — PROGRESS NOTES
2020    TELEHEALTH EVALUATION -- Audio/Visual (During ILFQX-44 public health emergency)    HPI:    Lobo Chawla (:  1985) has requested an audio/video evaluation for the following concern(s):    Chief Complaint   Patient presents with    Back Pain     PT C/O LOW BACK PAIN X1 WEEK. PT IS SEEING A CHIROPRACTOR. started 1 week ago after lifting up something in heavy. Known bulging disks for 1 year -   Pain progressed - no relief w/ robaxin  Went to ER  Got some drugs which helped some  Seen by chiropractor daily this week  Trying to get MRI approved. Getting pain down left occ and right most of time - may go down to [place]. Given naproxen  Interested in steroids. Had epidural injection last year which helped. Still having more spasm. Very limited mobility - off work til next week. Review of Systems    Prior to Visit Medications    Medication Sig Taking? Authorizing Provider   methocarbamol (ROBAXIN-750) 750 MG tablet Take 1 tablet by mouth every 6 hours as needed (back spasm)  Eloisa Kilgore MD   albuterol sulfate HFA (VENTOLIN HFA) 108 (90 Base) MCG/ACT inhaler Inhale 2 puffs into the lungs every 4 hours as needed for Wheezing  Eloisa Kilgore MD   predniSONE (DELTASONE) 10 MG tablet 3 tabs twice daily for 3 days, then 2 tabs twice daily for 3 days, then 1 tabs twice daily for 2 days, then 1 tab daily for 2 days. SHIRIN Cote CNP   tiZANidine (ZANAFLEX) 4 MG tablet Take 1 tablet by mouth every 6 hours as needed (spasm)  SHIRIN Cote CNP       Social History     Tobacco Use    Smoking status: Never Smoker    Smokeless tobacco: Never Used   Substance Use Topics    Alcohol use: No    Drug use:  No            PHYSICAL EXAMINATION:  [ INSTRUCTIONS:  \"[x]\" Indicates a positive item  \"[]\" Indicates a negative item  -- DELETE ALL ITEMS NOT EXAMINED]  Vital Signs: (As obtained by patient/caregiver or practitioner observation)    Blood pressure-  Heart rate- Respiratory rate-    Temperature-  Pulse oximetry-     Constitutional: [x] Appears well-developed and well-nourished [] No apparent distress      [] Abnormal-   Mental status  [x] Alert and awake  [] Oriented to person/place/time []Able to follow commands      Eyes:  EOM    []  Normal  [] Abnormal-  Sclera  []  Normal  [] Abnormal -         Discharge []  None visible  [] Abnormal -    HENT:   [x] Normocephalic, atraumatic. [] Abnormal   [] Mouth/Throat: Mucous membranes are moist.     External Ears [] Normal  [] Abnormal-     Neck: [] No visualized mass     Pulmonary/Chest: [x] Respiratory effort normal.  [] No visualized signs of difficulty breathing or respiratory distress        [] Abnormal-      Musculoskeletal:   [] Normal gait with no signs of ataxia         [] Normal range of motion of neck        [] Abnormal-       Neurological:        [x] No Facial Asymmetry (Cranial nerve 7 motor function) (limited exam to video visit)          [] No gaze palsy        [] Abnormal-         Skin:        [x] No significant exanthematous lesions or discoloration noted on facial skin         [] Abnormal-            Psychiatric:       [x] Normal Affect [] No Hallucinations        [] Abnormal-     Other pertinent observable physical exam findings-     ASSESSMENT/PLAN:  There are no diagnoses linked to this encounter. Diagnosis Orders   1. Lumbar radiculopathy     2. Bilateral low back pain with bilateral sciatica, unspecified chronicity  HYDROcodone-acetaminophen (NORCO) 5-325 MG per tablet     Change robaxin to flexeril - caution w/ sedation risk  Change naproxen to prednisone high dose taper - se dw/ pt  Add norco 5mg #20 1-2 q6h prn severe pain  Caution w/ flexeril but presently unable to sleep for long or move without a lot of pain  F/u chiropractor   MRI pending  oarrs reviewed and results c/w rx'd meds    No follow-ups on file.     Omari Brewer is a 38 y.o. female being evaluated by a Virtual Visit (video visit) encounter to address concerns as mentioned above. A caregiver was present when appropriate. Due to this being a TeleHealth encounter (During YCFDQ-65 public health emergency), evaluation of the following organ systems was limited: Vitals/Constitutional/EENT/Resp/CV/GI//MS/Neuro/Skin/Heme-Lymph-Imm. Pursuant to the emergency declaration under the [address] [place] [address] and the [employer] Act, this Virtual Visit was conducted with patient's (and/or legal guardian's) consent, to reduce the patient's risk of exposure to COVID-19 and provide necessary medical care. The patient (and/or legal guardian) has also been advised to contact this office for worsening conditions or problems, and seek emergency medical treatment and/or call 911 if deemed necessary. Patient identification was verified at the start of the visit: Yes    Total time spent on this encounter: 11-20 minutes    Services were provided through a video synchronous discussion virtually to substitute for in-person clinic visit. Patient and provider were located at their individual homes. --Keshawn Foster MD on 11/12/2020 at 11:45 AM    An electronic signature was used to authenticate this note.

## 2020-12-07 ENCOUNTER — PATIENT MESSAGE (OUTPATIENT)
Dept: FAMILY MEDICINE CLINIC | Age: 35
End: 2020-12-07

## 2020-12-08 RX ORDER — ETODOLAC 400 MG/1
400 TABLET, FILM COATED ORAL 2 TIMES DAILY
Qty: 60 TABLET | Refills: 0 | Status: SHIPPED | OUTPATIENT
Start: 2020-12-08 | End: 2021-01-04

## 2020-12-08 RX ORDER — VALACYCLOVIR HYDROCHLORIDE 1 G/1
1000 TABLET, FILM COATED ORAL 3 TIMES DAILY
Qty: 21 TABLET | Refills: 0 | Status: SHIPPED | OUTPATIENT
Start: 2020-12-08 | End: 2020-12-15

## 2020-12-08 NOTE — TELEPHONE ENCOUNTER
From: Abran Rico  To: Jessica Cool MD  Sent: 12/7/2020 8:18 PM EST  Subject: Non-Urgent Medical Question    I was at my chiropractor today and they noticed this rash on my back. It is in my mid back. They said it was shingles. Do I need to do anything? I went last thursday to an ortho surgeon about my back since still bad and he put me on methylprenisolone dosepak 4mg to hold me over and referred me to pain mgmt for that. He knew you put me on meds a few weeks ago too. I have two days left and only taking alieve right now. I have pain pills left you sent if back gets really bad on back. I just hate to take.

## 2020-12-11 RX ORDER — VALACYCLOVIR HYDROCHLORIDE 1 G/1
TABLET, FILM COATED ORAL
Qty: 21 TABLET | Refills: 0 | OUTPATIENT
Start: 2020-12-11

## 2021-01-04 RX ORDER — ETODOLAC 400 MG/1
TABLET, FILM COATED ORAL
Qty: 60 TABLET | Refills: 0 | Status: SHIPPED | OUTPATIENT
Start: 2021-01-04 | End: 2021-09-09 | Stop reason: ALTCHOICE

## 2021-02-26 ENCOUNTER — OFFICE VISIT (OUTPATIENT)
Dept: FAMILY MEDICINE CLINIC | Age: 36
End: 2021-02-26
Payer: COMMERCIAL

## 2021-02-26 VITALS
SYSTOLIC BLOOD PRESSURE: 112 MMHG | HEART RATE: 110 BPM | OXYGEN SATURATION: 99 % | DIASTOLIC BLOOD PRESSURE: 74 MMHG | TEMPERATURE: 98.1 F | HEIGHT: 65 IN | BODY MASS INDEX: 42.5 KG/M2

## 2021-02-26 DIAGNOSIS — R39.9 UTI SYMPTOMS: ICD-10-CM

## 2021-02-26 DIAGNOSIS — N30.01 ACUTE CYSTITIS WITH HEMATURIA: Primary | ICD-10-CM

## 2021-02-26 LAB
BILIRUBIN, POC: ABNORMAL
BLOOD URINE, POC: ABNORMAL
CLARITY, POC: ABNORMAL
COLOR, POC: ABNORMAL
GLUCOSE URINE, POC: ABNORMAL
KETONES, POC: ABNORMAL
LEUKOCYTE EST, POC: ABNORMAL
NITRITE, POC: POSITIVE
PH, POC: 6
PROTEIN, POC: ABNORMAL
SPECIFIC GRAVITY, POC: 1.02
UROBILINOGEN, POC: 0.2

## 2021-02-26 PROCEDURE — 81002 URINALYSIS NONAUTO W/O SCOPE: CPT | Performed by: NURSE PRACTITIONER

## 2021-02-26 PROCEDURE — 99213 OFFICE O/P EST LOW 20 MIN: CPT | Performed by: NURSE PRACTITIONER

## 2021-02-26 RX ORDER — SULFAMETHOXAZOLE AND TRIMETHOPRIM 800; 160 MG/1; MG/1
1 TABLET ORAL 2 TIMES DAILY
Qty: 14 TABLET | Refills: 0 | Status: SHIPPED | OUTPATIENT
Start: 2021-02-26 | End: 2021-03-05

## 2021-02-26 RX ORDER — PHENAZOPYRIDINE HYDROCHLORIDE 100 MG/1
100 TABLET, FILM COATED ORAL 3 TIMES DAILY PRN
Qty: 21 TABLET | Refills: 0 | Status: SHIPPED | OUTPATIENT
Start: 2021-02-26 | End: 2021-04-13

## 2021-02-26 SDOH — ECONOMIC STABILITY: TRANSPORTATION INSECURITY
IN THE PAST 12 MONTHS, HAS LACK OF TRANSPORTATION KEPT YOU FROM MEETINGS, WORK, OR FROM GETTING THINGS NEEDED FOR DAILY LIVING?: NO

## 2021-02-26 SDOH — ECONOMIC STABILITY: INCOME INSECURITY: HOW HARD IS IT FOR YOU TO PAY FOR THE VERY BASICS LIKE FOOD, HOUSING, MEDICAL CARE, AND HEATING?: SOMEWHAT HARD

## 2021-02-26 SDOH — ECONOMIC STABILITY: FOOD INSECURITY: WITHIN THE PAST 12 MONTHS, YOU WORRIED THAT YOUR FOOD WOULD RUN OUT BEFORE YOU GOT MONEY TO BUY MORE.: NEVER TRUE

## 2021-02-26 ASSESSMENT — PATIENT HEALTH QUESTIONNAIRE - PHQ9
SUM OF ALL RESPONSES TO PHQ QUESTIONS 1-9: 2
SUM OF ALL RESPONSES TO PHQ QUESTIONS 1-9: 2
SUM OF ALL RESPONSES TO PHQ9 QUESTIONS 1 & 2: 2

## 2021-02-26 ASSESSMENT — ENCOUNTER SYMPTOMS
BACK PAIN: 1
WHEEZING: 0
SHORTNESS OF BREATH: 0

## 2021-02-26 NOTE — PATIENT INSTRUCTIONS
Patient Education        sulfamethoxazole and trimethoprim (oral/injection)  Pronunciation:  SUL fa meth OX a zole and trye METH oh prim  Brand:  Bactrim, Bactrim DS, Bactrim I.V., Septra I.V., SMZ-TMP DS, Sulfatrim Pediatric  What is the most important information I should know about sulfamethoxazole and trimethoprim? You should not use this medicine if you have severe liver disease, kidney disease that is not being monitored, anemia caused by folic acid deficiency, if you take dofetilide, or if you have had low platelets caused by using trimethoprim or a sulfa drug. You should not take sulfamethoxazole and trimethoprim if you are pregnant or breastfeeding. What is sulfamethoxazole and trimethoprim? Sulfamethoxazole and trimethoprim is a combination antibiotic used to treat ear infections, urinary tract infections, bronchitis, traveler's diarrhea, shigellosis, and Pneumocystis jiroveci pneumonia. Sulfamethoxazole and trimethoprim may also be used for purposes not listed in this medication guide. What should I discuss with my healthcare provider before using sulfamethoxazole and trimethoprim? You should not use this medicine if you are allergic to sulfamethoxazole or trimethoprim, or if you have:  · severe liver disease;  · kidney disease that is not being treated or monitored;  · anemia (low red blood cells) caused by folic acid deficiency;  · a history of low blood platelets after taking trimethoprim or any sulfa drug; or  · if you take dofetilide (Tikosyn). Do not use if you are pregnant. Use effective birth control, and tell your doctor if you become pregnant. Do not breastfeed while using this medicine. This medicine should not be given to a child younger than 3 months old.    Tell your doctor if you have ever had:  · kidney or liver disease;  · a folate (folic acid) deficiency;  · asthma or severe allergies;  · a thyroid disorder;  · HIV or AIDS;  · malnourishment;  · alcoholism;  · high levels of potassium in your blood;  · porphyria, or glucose-6-phosphate dehydrogenase (G6PD) deficiency; or  · if you use a blood thinner (such as warfarin) and you have routine \"INR\" or prothrombin time tests. How should I use sulfamethoxazole and trimethoprim? This medicine is taken by mouth (oral) or given as an infusion into a vein (injection). Follow all directions on your prescription label and read all medication guides or instruction sheets. Use the medicine exactly as directed. Shake the oral suspension (liquid) before you measure a dose. Use the dosing syringe provided, or use a medicine dose-measuring device (not a kitchen spoon). A healthcare provider will give the first injection and may teach you how to properly use the medication by yourself. When using injections by yourself, be sure you understand how to properly mix and store the medicine. Ask your doctor or pharmacist if you don't understand all instructions. Drink plenty of fluids to prevent kidney stones while you are using this medicine. Sulfamethoxazole and trimethoprim doses are based on weight in children. Use only the recommended dose when giving this medicine to a child. Use this medicine for the full prescribed length of time, even if your symptoms quickly improve. Skipping doses can increase your risk of infection that is resistant to medication. This medicine will not treat a viral infection such as the flu or a common cold. You may need frequent medical tests. This medicine can affect the results of certain medical tests. Tell any doctor who treats you that you are using sulfamethoxazole and trimethoprim. Store at room temperature away from moisture, heat, and light. What happens if I miss a dose? Use the medicine as soon as you can, but skip the missed dose if it is almost time for your next dose. Do not use two doses at one time. What happens if I overdose?   Seek emergency medical attention or call the Poison Help line at include:  · nausea, vomiting, loss of appetite; or  · mild itching or rash. This is not a complete list of side effects and others may occur. Call your doctor for medical advice about side effects. You may report side effects to FDA at 8-516-DIH-6756. What other drugs will affect sulfamethoxazole and trimethoprim? You may need more frequent check- ups or medical tests if you also use medicine to treat depression, diabetes, seizures, or HIV. Many drugs can interact, and some drugs should not be used together. Tell your doctor about all your current medicines, especially:  · amantadine, cyclosporine, indomethacin, leucovorin, methotrexate, pyrimethamine;  · an \"ACE inhibitor\" heart or blood presure medication (benazepril, enalapril, lisinopril, quinapril, ramipril, and others); or  · a diuretic or \"water pill\" (chlorthalidone, hydrochlorothiazide, and others). This list is not complete and many other drugs may affect sulfamethoxazole and trimethoprim. This includes prescription and over-the-counter medicines, vitamins, and herbal products. Not all possible drug interactions are listed here. Where can I get more information? Your pharmacist can provide more information about sulfamethoxazole and trimethoprim. Remember, keep this and all other medicines out of the reach of children, never share your medicines with others, and use this medication only for the indication prescribed. Every effort has been made to ensure that the information provided by [address] is accurate, up-to-date, and complete, but no guarantee is made to that effect. Drug information contained herein may be time sensitive. [facility] information has been compiled for use by healthcare practitioners and consumers in the [place] and therefore [facility] does not warrant that uses outside of the [place] are appropriate, unless specifically indicated otherwise.  [facility] drug information does not endorse drugs, diagnose patients or recommend therapy. [facility] drug information is an informational resource designed to assist licensed healthcare practitioners in caring for their patients and/or to serve consumers viewing this service as a supplement to, and not a substitute for, the expertise, skill, knowledge and judgment of healthcare practitioners. The absence of a warning for a given drug or drug combination in no way should be construed to indicate that the drug or drug combination is safe, effective or appropriate for any given patient. [facility] does not assume any responsibility for any aspect of healthcare administered with the aid of information [facility] provides. The information contained herein is not intended to cover all possible uses, directions, precautions, warnings, drug interactions, allergic reactions, or adverse effects. If you have questions about the drugs you are taking, check with your doctor, nurse or pharmacist.  Copyright 3328-2890 [address]. Version: 9.01. Revision date: 3/8/2019. Care instructions adapted under license by [facility] ([facility]). If you have questions about a medical condition or this instruction, always ask your healthcare professional. [place] [address] any warranty or liability for your use of this information. Patient Education        phenazopyridine  Pronunciation:  fen AY nan PIR i anai  Brand:  AZO Urinary Pain Relief, Azo-Gesic, Azo-Standard, Baridium, Prodium, Pyridium, Re-Azo, Uricalm  What is the most important information I should know about phenazopyridine? You should not use phenazopyridine if you have kidney disease. What is phenazopyridine? Phenazopyridine is a pain reliever that affects the lower part of your urinary tract (bladder and urethra). Phenazopyridine is used to treat urinary symptoms such as pain or burning, increased urination, and increased urge to urinate.  These symptoms can be caused by infection, injury, surgery, catheter, or other conditions that irritate the bladder. Phenazopyridine will treat urinary symptoms, but this medication will not treat a urinary tract infection. . Take any antibiotic that your doctor prescribes to treat an infection. Phenazopyridine may also be used for purposes not listed in this medication guide. What should I discuss with my health care provider before taking phenazopyridine? You should not use phenazopyridine if you are allergic to it, or if you have kidney disease. To make sure phenazopyridine is safe for you, tell your doctor if you have:  · liver disease;  · diabetes; or  · a genetic enzyme deficiency called glucose-6-phosphate dehydrogenase (G6PD) deficiency. FDA pregnancy category B. Phenazopyridine is not expected to harm an unborn baby. Do not use this medicine without a doctor's advice if you are pregnant. It is not known whether phenazopyridine passes into breast milk or if it could harm a nursing baby. Do not use this medicine without a doctor's advice if you are breast-feeding a baby. How should I take phenazopyridine? Use exactly as directed on the label, or as prescribed by your doctor. Do not use in larger or smaller amounts or for longer than recommended. Take phenazopyridine after meals. Drink plenty of liquids while you are taking phenazopyridine. Phenazopyridine will most likely darken the color of your urine to an orange or red color. This is a normal effect and is not harmful. Darkened urine may also cause stains to your underwear that may be permanent. Phenazopyridine can also permanently stain soft contact lenses, and you should not wear them while taking this medicine. Do not use phenazopyridine for longer than 2 days unless your doctor has told you to. This medication can cause unusual results with urine tests. Tell any doctor who treats you that you are using phenazopyridine. Store at room temperature away from moisture and heat. What happens if I miss a dose?   Take the missed dose as soon as you remember. Skip the missed dose if it is almost time for your next scheduled dose. Do not take extra medicine to make up the missed dose. What happens if I overdose? Seek emergency medical attention or call the Poison Help line at 1-543.718.6093. What should I avoid while taking phenazopyridine? Do not use this medication while wearing soft contact lenses. Phenazopyridine can permanently discolor soft contact lenses. What are the possible side effects of phenazopyridine? Get emergency medical help if you have any of these signs of an allergic reaction:  hives; difficult breathing; swelling of your face, lips, tongue, or throat. Stop using phenazopyridine and call your doctor at once if you have:  · little or no urinating;  · swelling, rapid weight gain;  · confusion, loss of appetite, pain in your side or lower back;  · fever, pale or yellowed skin, stomach pain, nausea and vomiting; or  · blue or purple appearance of your skin. Common side effects may include:  · headache;  · dizziness; or  · upset stomach. This is not a complete list of side effects and others may occur. Call your doctor for medical advice about side effects. You may report side effects to FDA at 9-813-EYC-3325. What other drugs will affect phenazopyridine? Other drugs may interact with phenazopyridine, including prescription and over-the-counter medicines, vitamins, and herbal products. Tell each of your health care providers about all medicines you use now and any medicine you start or stop using. Where can I get more information? Your pharmacist can provide more information about phenazopyridine. Remember, keep this and all other medicines out of the reach of children, never share your medicines with others, and use this medication only for the indication prescribed.    Every effort has been made to ensure that the information provided by [address] is accurate, up-to-date, and complete, but no guarantee is made to that effect. Drug information contained herein may be time sensitive. [facility] information has been compiled for use by healthcare practitioners and consumers in the [place] and therefore [employer] does not warrant that uses outside of the [place] are appropriate, unless specifically indicated otherwise. [facility] drug information does not endorse drugs, diagnose patients or recommend therapy. [facility] drug information is an informational resource designed to assist licensed healthcare practitioners in caring for their patients and/or to serve consumers viewing this service as a supplement to, and not a substitute for, the expertise, skill, knowledge and judgment of healthcare practitioners. The absence of a warning for a given drug or drug combination in no way should be construed to indicate that the drug or drug combination is safe, effective or appropriate for any given patient. [facility] does not assume any responsibility for any aspect of healthcare administered with the aid of information [facility] provides. The information contained herein is not intended to cover all possible uses, directions, precautions, warnings, drug interactions, allergic reactions, or adverse effects. If you have questions about the drugs you are taking, check with your doctor, nurse or pharmacist.  Copyright 2739-6454 [address]. Version: 3.05. Revision date: 4/24/2014. Care instructions adapted under license by [facility] ([facility]). If you have questions about a medical condition or this instruction, always ask your healthcare professional. [place] [address] any warranty or liability for your use of this information.

## 2021-02-26 NOTE — PROGRESS NOTES
Domenic Bergeron (:  1985) is a 38 y.o. female,Established patient, here for evaluation of the following chief complaint(s):  Urinary Tract Infection (PT CO UTI SX OVER LAST 3 DAYS- URGENCY AND FREQUENCY WITH SMALL AMOUNTS WHILE DRINKING A LOT OF WATER. BURNING WITH URINATION. )      ASSESSMENT/PLAN:  1. Acute cystitis with hematuria  -Urinalysis indicative of UTI. Urine is being sent for culture. Bactrim is being sent to the pharmacy as well as Pyridium for pain. Patient has been educated on the medication uses as well as side effects. She has been educated to monitor her urine output very closely. If she continues to have decreased urine output and develops flank pain, she is to go to the ER to rule out pyelonephritis. She will otherwise follow-up as needed if symptoms fail to improve. -     Culture, Urine; Future  -     sulfamethoxazole-trimethoprim (BACTRIM DS;SEPTRA DS) 800-160 MG per tablet; Take 1 tablet by mouth 2 times daily for 7 days, Disp-14 tablet, R-0Normal  -     Culture, Urine  -     phenazopyridine (PYRIDIUM) 100 MG tablet; Take 1 tablet by mouth 3 times daily as needed for Pain, Disp-21 tablet, R-0Normal  2. UTI symptoms  -     POCT Urinalysis no Micro      Return if symptoms worsen or fail to improve. SUBJECTIVE/OBJECTIVE:  DAMION Munoz presents today with possible UTI. She states that she has been having burning and frequency with urination for approximately 3 days. She also notes that she was wiping some blood. She states that she has been trying to drink a large amount of water and is having decreased urination. She was able to leave a sample today and states that this was the first time she had peed today despite drinking over a liter of fluid. She states that she has been having 1-2 urine occurrences a day since symptoms started. Denies any flank pain, though it is difficult to assess given chronic back issues.   She denies any fevers, headaches, chest pain, shortness of breath, or other issues. Review of Systems   Constitutional: Negative for chills and fever. Respiratory: Negative for shortness of breath and wheezing. Cardiovascular: Negative for chest pain, palpitations and leg swelling. Genitourinary: Positive for decreased urine volume, dysuria, frequency, hematuria and urgency. Negative for difficulty urinating, flank pain and pelvic pain. Musculoskeletal: Positive for back pain. Neurological: Negative for dizziness, weakness, light-headedness, numbness and headaches. Physical Exam  Constitutional:       General: She is not in acute distress. Appearance: Normal appearance. She is obese. Cardiovascular:      Pulses: Normal pulses. Heart sounds: Normal heart sounds. No murmur. No gallop. Pulmonary:      Effort: Pulmonary effort is normal.      Breath sounds: Normal breath sounds. No wheezing. Neurological:      Mental Status: She is alert and oriented to person, place, and time. Psychiatric:         Mood and Affect: Mood normal.         Behavior: Behavior normal.         Thought Content: Thought content normal.         Judgment: Judgment normal.      Urine dipstick shows positive for nitrites, leukocytes, red blood cells. On this date 02/26/21 I have spent 20 minutes reviewing previous notes, test results and face to face with the patient discussing the diagnosis and importance of compliance with the treatment plan as well as documenting on the day of the visit. An electronic signature was used to authenticate this note.     --SHIRIN Galvez - CNP

## 2021-03-01 LAB
ORGANISM: ABNORMAL
URINE CULTURE, ROUTINE: ABNORMAL

## 2021-03-01 NOTE — LETTER
[address] [id number]  Phone: [phone]  Fax: [phone]    SHIRIN Newby CNP        March 1, 2021     Patient: Darlyn Ceron   YOB: 1985   Date of Visit: 3/1/2021       To Whom It May Concern: It is my medical opinion that Anjana Brice may return to work on Thurday 2/4. If you have any questions or concerns, please don't hesitate to call.     Sincerely,        SHIRIN Newby CNP

## 2021-03-17 ENCOUNTER — PATIENT MESSAGE (OUTPATIENT)
Dept: FAMILY MEDICINE CLINIC | Age: 36
End: 2021-03-17

## 2021-03-17 NOTE — LETTER
[address] [address]  Phone: [phone]  Fax: [phone]    Yu Chavez MD        March 18, 2021     Patient: Alvaro Bowers   YOB: 1985   Date of Visit: 3/17/2021       To Whom It May Concern: It is my medical opinion that Hanny Mortensen requires a disability parking placard for the following reasons:  She cannot walk 200 feet without stopping to rest.  Duration of need: 6 months    If you have any questions or concerns, please don't hesitate to call.     Sincerely,        Yu Chavez MD

## 2021-03-18 NOTE — TELEPHONE ENCOUNTER
From: Bedford Cranker  To: Jody Frederick MD  Sent: 3/17/2021 9:45 PM EDT  Subject: Non-Urgent Medical Question    I am in process of another MRI and then surgery. He is thinking possible spinal fusion but waiting to see new MRI since so much changed since December. I have have had epidural and 2 different nerve blocks since then. I am not very mobile right now. I asked the surgeons office about getting a temporary handicap sticker and they said I needed to reach out to you to do it. Is there a way to get a script for that? If not I understand.

## 2021-04-13 ENCOUNTER — OFFICE VISIT (OUTPATIENT)
Dept: FAMILY MEDICINE CLINIC | Age: 36
End: 2021-04-13
Payer: COMMERCIAL

## 2021-04-13 VITALS
DIASTOLIC BLOOD PRESSURE: 76 MMHG | HEIGHT: 65 IN | SYSTOLIC BLOOD PRESSURE: 114 MMHG | TEMPERATURE: 98.2 F | HEART RATE: 100 BPM | OXYGEN SATURATION: 97 % | RESPIRATION RATE: 20 BRPM | WEIGHT: 281 LBS | BODY MASS INDEX: 46.82 KG/M2

## 2021-04-13 DIAGNOSIS — J45.20 MILD INTERMITTENT ASTHMA WITHOUT COMPLICATION: ICD-10-CM

## 2021-04-13 DIAGNOSIS — M54.16 LUMBAR RADICULOPATHY: ICD-10-CM

## 2021-04-13 DIAGNOSIS — E66.01 CLASS 3 SEVERE OBESITY DUE TO EXCESS CALORIES WITH SERIOUS COMORBIDITY AND BODY MASS INDEX (BMI) OF 45.0 TO 49.9 IN ADULT (HCC): ICD-10-CM

## 2021-04-13 DIAGNOSIS — Z01.818 PRE-OP EXAMINATION: Primary | ICD-10-CM

## 2021-04-13 PROCEDURE — 99214 OFFICE O/P EST MOD 30 MIN: CPT | Performed by: NURSE PRACTITIONER

## 2021-04-13 RX ORDER — DICLOFENAC SODIUM 75 MG/1
TABLET, DELAYED RELEASE ORAL
COMMUNITY
Start: 2021-03-04 | End: 2021-09-09 | Stop reason: ALTCHOICE

## 2021-04-13 ASSESSMENT — ENCOUNTER SYMPTOMS
COLOR CHANGE: 0
NAUSEA: 0
COUGH: 0
CONSTIPATION: 0
CHEST TIGHTNESS: 0
SINUS PAIN: 0
ABDOMINAL PAIN: 0
SHORTNESS OF BREATH: 0
BACK PAIN: 1
SINUS PRESSURE: 0
ABDOMINAL DISTENTION: 0
EYE DISCHARGE: 0
DIARRHEA: 0

## 2021-04-13 NOTE — PROGRESS NOTES
Preoperative Consultation      Pankaj Shafer  YOB: 1985    Date of Service:  4/13/2021    Vitals:    04/13/21 1633   BP: 114/76   Site: Right Upper Arm   Position: Sitting   Cuff Size: Large Adult   Pulse: 100   Resp: 20   Temp: 98.2 °F (36.8 °C)   TempSrc: Oral   SpO2: 97%   Weight: 281 lb (127.5 kg)   Height: 5' 5\" (1.651 m)      Wt Readings from Last 2 Encounters:   04/13/21 281 lb (127.5 kg)   07/08/19 255 lb 6.4 oz (115.8 kg)     BP Readings from Last 3 Encounters:   04/13/21 114/76   02/26/21 112/74   07/08/19 112/62        Chief Complaint   Patient presents with    Pre-op Exam     pre-op POSTERIOR LUMBAR LAMINECTOMY, FACETECTOMY, OSTEOTOMY AND INSTRUMENTED FUSION L4/5, 04/20/2021, Yury Shell, Faustina Whitmore, [facility]      Allergies   Allergen Reactions    Nsaids      Other reaction(s): Other (See Comments)  Colitis flares up    Other Nausea And Vomiting     vibrezi     No outpatient medications have been marked as taking for the 4/13/21 encounter (Office Visit) with SHIRIN Lancaster CNP. This patient presents to the office today for a preoperative consultation at the request of surgeon, Dr. Yury Shell, who plans on performing POSTERIOR LUMBAR LAMINECTOMY, FACETECTOMY, OSTEOTOMY AND INSTRUMENTED FUSION L4/5 on April 20 at [facility]. The current problem began 6 months ago, and symptoms have been worsening with time. Conservative therapy: steroid shots, stedroid packs, nerve blocks, chiro, physical therapy, epidural injections.     Planned anesthesia: General   Known anesthesia problems: None   Bleeding risk: No recent or remote history of abnormal bleeding  Personal or FH of DVT/PE: No    Patient objection to receiving blood products: No    Patient Active Problem List   Diagnosis    Asthma    Telogen effluvium    Migraine    Elevated blood pressure reading    Acute bronchitis    Colitis, collagenous       Past Medical History:   Diagnosis Date  Asthma 2011    Colitis, collagenous 2016    Depression     History of chicken pox 1989    IBS (irritable bowel syndrome)     Telogen effluvium 2011     Past Surgical History:   Procedure Laterality Date     SECTION       SECTION      CHOLECYSTECTOMY  2010    WISDOM TOOTH EXTRACTION      APPROX 5 YRS AGO     Family History   Problem Relation Age of Onset    High Cholesterol Mother     High Blood Pressure Mother     Asthma Mother     Depression Mother     High Cholesterol Father     High Blood Pressure Father     Other Father         diverticulitis    Asthma Maternal Aunt     Depression Maternal Aunt     High Cholesterol Maternal Grandmother     High Blood Pressure Maternal Grandmother     Stroke Maternal Grandmother     Depression Maternal Grandmother     High Cholesterol Maternal Grandfather     High Blood Pressure Maternal Grandfather     Cancer Maternal Grandfather         LUNG CA    High Cholesterol Paternal Grandmother     High Blood Pressure Paternal Grandmother     High Cholesterol Paternal Grandfather     High Blood Pressure Paternal Grandfather     Cancer Paternal Grandfather         LUNG CANCER    Asthma Daughter      Social History     Socioeconomic History    Marital status:      Spouse name: Not on file    Number of children: Not on file    Years of education: Not on file    Highest education level: Not on file   Occupational History    Not on file   Social Needs    Financial resource strain: Somewhat hard    Food insecurity     Worry: Never true     Inability: Never true    Transportation needs     Medical: No     Non-medical: No   Tobacco Use    Smoking status: Never Smoker    Smokeless tobacco: Never Used   Substance and Sexual Activity    Alcohol use: No    Drug use: No    Sexual activity: Not on file   Lifestyle    Physical activity     Days per week: Not on file     Minutes per session: Not on file    Stress: Not on file   Relationships    Social connections     Talks on phone: Not on file     Gets together: Not on file     Attends Buddhism service: Not on file     Active member of club or organization: Not on file     Attends meetings of clubs or organizations: Not on file     Relationship status: Not on file    Intimate partner violence     Fear of current or ex partner: Not on file     Emotionally abused: Not on file     Physically abused: Not on file     Forced sexual activity: Not on file   Other Topics Concern    Not on file   Social History Narrative    Not on file       Review of Systems   Constitutional: Negative for activity change, appetite change, fatigue, fever and unexpected weight change. HENT: Negative for congestion, ear pain, sinus pressure and sinus pain. Eyes: Negative for discharge and visual disturbance. Respiratory: Negative for cough, chest tightness and shortness of breath. Cardiovascular: Negative for chest pain, palpitations and leg swelling. Gastrointestinal: Negative for abdominal distention, abdominal pain, constipation, diarrhea and nausea. Endocrine: Negative for cold intolerance, heat intolerance, polydipsia, polyphagia and polyuria. Genitourinary: Negative for decreased urine volume, difficulty urinating, dysuria, flank pain, frequency and urgency. Musculoskeletal: Positive for back pain. Negative for arthralgias, gait problem, joint swelling, myalgias and neck pain. Skin: Negative for color change, rash and wound. Allergic/Immunologic: Negative for food allergies and immunocompromised state. Neurological: Positive for numbness (RLE). Negative for dizziness, tremors, speech difficulty, weakness, light-headedness and headaches. Hematological: Negative for adenopathy. Does not bruise/bleed easily. Psychiatric/Behavioral: Negative for confusion, decreased concentration, self-injury, sleep disturbance and suicidal ideas.  The patient is not preauricular, posterior auricular or occipital adenopathy. Cervical: No cervical adenopathy. Right cervical: No superficial, deep or posterior cervical adenopathy. Left cervical: No superficial, deep or posterior cervical adenopathy. Upper Body:      Right upper body: No supraclavicular adenopathy. Left upper body: No supraclavicular adenopathy. Skin:     General: Skin is warm and dry. Capillary Refill: Capillary refill takes less than 2 seconds. Neurological:      General: No focal deficit present. Mental Status: She is alert and oriented to person, place, and time. Mental status is at baseline. Sensory: Sensation is intact. Motor: Motor function is intact. Coordination: Coordination is intact. Gait: Gait is intact. Psychiatric:         Attention and Perception: Attention and perception normal.         Mood and Affect: Mood and affect normal.         Speech: Speech normal.         Behavior: Behavior normal. Behavior is cooperative. Thought Content: Thought content normal.         Cognition and Memory: Cognition and memory normal.         Judgment: Judgment normal.          EKG Interpretation:  NA. Lab Review   Office Visit on 02/26/2021   Component Date Value    Color, UA 02/26/2021 DARK YELLOW     Clarity, UA 02/26/2021 CLOUDY     Glucose, UA POC 02/26/2021 NEG     Bilirubin, UA 02/26/2021 MODERATE     Ketones, UA 02/26/2021 NEG     Spec Grav, UA 02/26/2021 1.025     Blood, UA POC 02/26/2021 LARGE     pH, UA 02/26/2021 6.0     Protein, UA POC 02/26/2021 30MG     Urobilinogen, UA 02/26/2021 0.2     Leukocytes, UA 02/26/2021 SMALL     Nitrite, UA 02/26/2021 POSITIVE     Organism 02/26/2021 Escherichia coli*    Urine Culture, Routine 02/26/2021 >100,000 CFU/ml            Assessment:       38 y.o. patient with planned surgery as above.     Known risk factors for perioperative complications: Asthma  Current medications which may produce withdrawal symptoms if withheld perioperatively: none      Plan:     1. Preoperative workup as follows: none  2. Change in medication regimen before surgery: Discontinue NSAIDs (Ibuprofen, aleve, advil, motrin, naprosyn, naproxen, etc etolodac, diclofenac) 7 days before surgery  3. Prophylaxis for cardiac events with perioperative beta-blockers: Not indicated  ACC/AHA indications for pre-operative beta-blocker use:    · Vascular surgery with history of postitive stress test  · Intermediate or high risk surgery with history of CAD   · Intermediate or high risk surgery with multiple clinical predictors of CAD- 2 of the following: history of compensated or prior heart failure, history of cerebrovascular disease, DM, or renal insufficiency    Routine administration of higher-dose, long-acting metoprolol in beta-blocker-naïve patients on the day of surgery, and in the absence of dose titration is associated with an overall increase in mortality. Beta-blockers should be started days to weeks prior to surgery and titrated to pulse < 70.  4. Deep vein thrombosis prophylaxis: regimen to be chosen by surgical team  5. No contraindications to planned surgery      1. Pre-op examination   Cleared for sx and anesthesia    2. Lumbar radiculopathy   Plan for sx in 1 week    3. Mild intermittent asthma without complication   Stable with albuterol PRN   Comorbid condition for surgery    4. Class 3 severe obesity due to excess calories with serious comorbidity and body mass index (BMI) of 45.0 to 49.9 in adult [facility])   Comorbid condition for surgery   Exercise as tolerated post op   Weight loss, initial goal 10% of body weight recommended    Care Gaps Addressed  Hep C screen recommended with next blood draw   Chicken pox as child  PNA vaccine recommended  COVID vaccine- recommended      I have reviewed patient's pertinent medical history, relevant laboratory and imaging studies, and past/future health maintenance.  Discussed with the

## 2021-04-13 NOTE — PATIENT INSTRUCTIONS
your back for 10 to 20 minutes at a time, several times a day. (Put a thin cloth between the ice pack and your skin.) This reduces pain and makes it easier to be active and exercise. · Take pain medicines exactly as directed. ? If the doctor gave you a prescription medicine for pain, take it as prescribed. ? If you are not taking a prescription pain medicine, ask your doctor if you can take an over-the-counter medicine. When should you call for help? Call 911 anytime you think you may need emergency care. For example, call if:    · You are unable to move a leg at all.     · You have back pain with severe belly pain.     · You have symptoms of a heart attack. These may include:  ? Chest pain or pressure, or a strange feeling in the chest.  ? Sweating. ? Shortness of breath. ? Nausea or vomiting. ? Pain, pressure, or a strange feeling in the back, neck, jaw, or upper belly or in one or both shoulders or arms. ? Lightheadedness or sudden weakness. ? A fast or irregular heartbeat. After you call 911, the  may tell you to chew 1 adult-strength or 2 to 4 low-dose aspirin. Wait for an ambulance. Do not try to drive yourself. Call your doctor now or seek immediate medical care if:    · You have new or worse symptoms in your arms, legs, chest, belly, or buttocks. Symptoms may include:  ? Numbness or tingling. ? Weakness. ? Pain.     · You lose bladder or bowel control.     · You have back pain and:  ? You have injured your back while lifting or doing some other activity. Call if the pain is severe, has not gone away after 1 or 2 days, and you cannot do your normal daily activities. ? You have had a back injury before that needed treatment. ? Your pain has lasted longer than 4 weeks. ? You have had weight loss you cannot explain. ? You have a fever. ? You are age 48 or older. ? You have cancer now or have had it before.    Watch closely for changes in your health, and be sure to contact your doctor if you are not getting better as expected. Where can you learn more? Go to [email or web address]://[email or web address].[email or web address] org and sign in to your [employer] account. Enter L393 in the [facility] box to learn more about \"Back Pain, Emergency or Urgent Symptoms: Care Instructions. \"     If you do not have an account, please click on the \"Sign Up Now\" link. Current as of: February 26, 2020               Content Version: 12.8  © 2006-2021 Healthwise, Incorporated. Care instructions adapted under license by [facility] ([facility]). If you have questions about a medical condition or this instruction, always ask your healthcare professional. [place] [address] any warranty or liability for your use of this information. Patient Education        Learning About How to Prepare for Surgery  How can you prepare before surgery? You can do some things that will help you safely prepare for surgery. · Understand exactly what surgery is planned. You should know the risks, benefits, and other options. · Tell your doctors ALL the medicines, vitamins, supplements, and herbal remedies you take. Some of these can increase the risk of bleeding. Or they may interact with anesthesia. · Follow your doctor's instructions about which medicines to take or stop before your surgery. ? You may need to stop taking some medicines a week or more before surgery. ? If you take aspirin or some other blood thinner, be sure to talk to your doctor. · Follow any other instructions your doctor gave you. · If you have an advance directive, let your doctor know, and bring a copy to the hospital.   It may include a living will and a durable power of  for health care. It lets your doctor and loved ones know your health care wishes. If you don't have one, you may want to prepare one. How can you prepare on the day of surgery? Here are some tips about what to do at home before you leave for your surgery.   · If your doctor told you to take your medicines on the day of surgery, take them with only a sip of water. · Follow the instructions about when to stop eating and drinking. If you don't, your surgery may be canceled. · Follow your doctor's instructions about when to bathe or shower before your surgery. · Do not shave the surgical site yourself. · Take off all jewelry and piercings. · Take out contact lenses, if you wear them. · Have a picture ID ready to take with you. Your ID will be checked before your surgery. · Know when to call your doctor. Call your doctor if you:  ? Become ill before surgery. ? Need to reschedule. ? Have changed your mind about having the surgery. What happens before surgery? Here are some things you can expect to happen before your surgery. · Your picture ID will be checked. · The area of your body that needs surgery is often marked to make sure there are no errors. · You will be kept comfortable and safe by your anesthesia provider. The anesthesia may make you sleep. Or it may just numb the area being worked on. What happens when you are ready to go home? Be sure you have someone drive you home. Anesthesia and pain medicine make it unsafe for you to drive. You will get instructions about recovering from your surgery. This is called a discharge plan. It will cover things like diet, wound care, follow-up care, driving, and getting back to your normal routine. Follow-up care is a key part of your treatment and safety. Be sure to make and go to all appointments, and call your doctor if you are having problems. It's also a good idea to know your test results and keep a list of the medicines you take. Where can you learn more? Go to https://Zealifyamanda.[email or web address] org and sign in to your [employer] account. Enter Q270 in the [employer] box to learn more about \"Learning About How to Prepare for Surgery. \"     If you do not have an account, please click on the \"Sign Up Now\" link. Current as of: May 27, 2020               Content Version: 12.8  © 0018-1265 Healthwise, Incorporated. Care instructions adapted under license by [facility] ([facility]). If you have questions about a medical condition or this instruction, always ask your healthcare professional. [address] any warranty or liability for your use of this information.

## 2021-05-05 ENCOUNTER — TELEPHONE (OUTPATIENT)
Dept: FAMILY MEDICINE CLINIC | Age: 36
End: 2021-05-05

## 2021-05-05 RX ORDER — AMLODIPINE BESYLATE 5 MG/1
5 TABLET ORAL DAILY
Qty: 30 TABLET | Refills: 1 | Status: SHIPPED | OUTPATIENT
Start: 2021-05-05 | End: 2021-06-01

## 2021-05-05 NOTE — TELEPHONE ENCOUNTER
Patient had a spinal fusion done on 4/20/21    Physical Therapy is at her house today  BP     160/100 manual       145/103 automated   They told her to call to get instructions

## 2021-05-05 NOTE — TELEPHONE ENCOUNTER
SPOKE WITH PT AND ADVISED, BP HAS BEEN ELEVATED FOR A WEEK BUT NOW IT WILL NOT COME DOWN TO NORMAL LEVEL, I HAVE HER SCHEDULED FOR TOMORROW MORNING AT [facility].

## 2021-05-06 ENCOUNTER — OFFICE VISIT (OUTPATIENT)
Dept: FAMILY MEDICINE CLINIC | Age: 36
End: 2021-05-06
Payer: MEDICAID

## 2021-05-06 VITALS
HEIGHT: 65 IN | HEART RATE: 96 BPM | BODY MASS INDEX: 46.76 KG/M2 | DIASTOLIC BLOOD PRESSURE: 90 MMHG | SYSTOLIC BLOOD PRESSURE: 128 MMHG

## 2021-05-06 DIAGNOSIS — I10 ESSENTIAL HYPERTENSION: Primary | ICD-10-CM

## 2021-05-06 DIAGNOSIS — M51.36 DDD (DEGENERATIVE DISC DISEASE), LUMBAR: ICD-10-CM

## 2021-05-06 PROCEDURE — 99213 OFFICE O/P EST LOW 20 MIN: CPT | Performed by: FAMILY MEDICINE

## 2021-05-06 RX ORDER — GABAPENTIN 300 MG/1
300 CAPSULE ORAL EVERY 8 HOURS
COMMUNITY
Start: 2021-04-30 | End: 2021-09-09 | Stop reason: ALTCHOICE

## 2021-05-06 RX ORDER — ACETAMINOPHEN 325 MG/1
975 TABLET ORAL EVERY 8 HOURS
COMMUNITY
Start: 2021-04-22

## 2021-05-06 RX ORDER — AMOXICILLIN 250 MG
1 CAPSULE ORAL 2 TIMES DAILY
COMMUNITY
Start: 2021-04-22 | End: 2021-09-09 | Stop reason: ALTCHOICE

## 2021-05-06 RX ORDER — OXYCODONE HYDROCHLORIDE 5 MG/1
5 TABLET ORAL DAILY PRN
COMMUNITY
Start: 2021-04-22 | End: 2022-02-25 | Stop reason: ALTCHOICE

## 2021-05-06 RX ORDER — METHOCARBAMOL 500 MG/1
500 TABLET, FILM COATED ORAL 3 TIMES DAILY
COMMUNITY
Start: 2021-04-22 | End: 2022-02-25 | Stop reason: ALTCHOICE

## 2021-05-06 NOTE — PROGRESS NOTES
[facility] Medicine  Clinic Note    Date: 2021                                               Subjective:     Chief Complaint   Patient presents with    Hypertension     ELEVATED BLOOD PRESSURE      HPI  BP Readings from Last 3 Encounters:   21 (!) 128/90   21 114/76   21 112/74     Pulse Readings from Last 3 Encounters:   21 96   21 100   21 110     Took norvasc this am 5mg - no hx in past of problems  Recent back surgery  In brace / walking w/ walker  Lipid/ cmp okay in last couple years - had posterior laminectomy L4-5  Op note / d/c summary reviewed  HAD NERVE BLOCKS X2 PRIOR TO SURGERY AND BP HIGH  Had been at high end of normal  Watching it at hospital - there 2 nights - came home Thursday afternoon. bp ranging 107/81 to 155/ . Had ha last pm - gets stress ha frontal   Medicine not usually helpful  bp lower this am 118/92. Sheldon Mendez still there this morning  On apap/ vit c/ d w/ gabapentin, robaxin, sennaS, and oxycodone but using it not often. Using tylenol tid. Pain still 6/10 but better overall -drop foot on right - had terrible pain down right leg prior to surgery - which is better. Doing PT to work on leg. Seeing improvement w/ tens  No change in caffeine/ salt. Cut to 1-2 pops/ day.   Dad / aunts/ uncles w/ htn  Hr 90's  No cp/palp       Patient Active Problem List    Diagnosis Date Noted    Colitis, collagenous 2016    Acute bronchitis 2012    Elevated blood pressure reading 2012    Migraine 2011    Asthma 2011    Telogen effluvium 2011     Past Medical History:   Diagnosis Date    Asthma 2011    Colitis, collagenous 2016    Depression     History of chicken pox 1989    IBS (irritable bowel syndrome)     Telogen effluvium 2011     Past Surgical History:   Procedure Laterality Date     SECTION       SECTION      CHOLECYSTECTOMY  2010    WISDOM TOOTH 8.6-50 MG per tablet Take 1 tablet by mouth 2 times daily      amLODIPine (NORVASC) 5 MG tablet Take 1 tablet by mouth daily 30 tablet 1    diclofenac (VOLTAREN) 75 MG EC tablet       Naproxen Sodium (ALEVE PO) Take by mouth as needed      Nutritional Supplements (VITAMIN D BOOSTER PO) Take 5,000 mg by mouth daily      Ascorbic Acid (HEBER-C PO) Take by mouth      etodolac (LODINE) 400 MG tablet TAKE ONE TABLET BY MOUTH TWICE A DAY 60 tablet 0    albuterol sulfate HFA (VENTOLIN HFA) 108 (90 Base) MCG/ACT inhaler Inhale 2 puffs into the lungs every 4 hours as needed for Wheezing 1 Inhaler 1     No current facility-administered medications for this visit. Allergies   Allergen Reactions    Other Nausea And Vomiting     edgari       Review of Systems    Objective:  BP (!) 128/90 (Site: Left Upper Arm, Position: Sitting, Cuff Size: Large Adult)   Pulse 96   Ht 5' 5\" (1.651 m)   LMP  (Exact Date)   Breastfeeding No   BMI 46.76 kg/m²     BP Readings from Last 3 Encounters:   05/06/21 (!) 128/90   04/13/21 114/76   02/26/21 112/74       Pulse Readings from Last 3 Encounters:   05/06/21 96   04/13/21 100   02/26/21 110       Wt Readings from Last 3 Encounters:   04/13/21 281 lb (127.5 kg)   07/08/19 255 lb 6.4 oz (115.8 kg)   03/05/19 244 lb (110.7 kg)       Physical Exam  Constitutional:       General: She is not in acute distress. Appearance: She is well-developed. HENT:      Head: Normocephalic and atraumatic. Mouth/Throat:      Pharynx: No oropharyngeal exudate. Eyes:      General: No scleral icterus. Conjunctiva/sclera: Conjunctivae normal.   Neck:      Thyroid: No thyromegaly. Cardiovascular:      Rate and Rhythm: Normal rate and regular rhythm. Heart sounds: Normal heart sounds. No murmur. Pulmonary:      Effort: Pulmonary effort is normal. No respiratory distress. Breath sounds: Normal breath sounds. No wheezing or rales.    Musculoskeletal:         General: Swelling (trace ankles) present. Lymphadenopathy:      Cervical: No cervical adenopathy. Skin:     General: Skin is warm and dry. Neurological:      Mental Status: She is alert and oriented to person, place, and time. Assessment/Plan:      Diagnosis Orders   1. Essential hypertension     2. DDD (degenerative disc disease), lumbar     Monitor bp - precautions d/ w pt including low salt/ caffeine moderation/ diet/ exercise w/ low fat / increased fruit / veggies d/w pt  ER if 200/120 or higher or if symptoms  Cont to record readings  norvasc 5mg daily  Activity and elevation of legs at rest dw/ pt  Take norvasc at night for possible edema    F/u in 2-3 weeks for PE / fasting labs/ bp check  Reviewed surgery note/ labs  No follow-ups on file.     Morales Christian DO  5/6/2021  10:38 AM

## 2021-06-01 ENCOUNTER — TELEPHONE (OUTPATIENT)
Dept: FAMILY MEDICINE CLINIC | Age: 36
End: 2021-06-01

## 2021-06-01 RX ORDER — AMLODIPINE BESYLATE AND BENAZEPRIL HYDROCHLORIDE 10; 20 MG/1; MG/1
1 CAPSULE ORAL DAILY
Qty: 30 CAPSULE | Refills: 1 | Status: SHIPPED | OUTPATIENT
Start: 2021-06-01 | End: 2021-09-09 | Stop reason: ALTCHOICE

## 2021-06-01 NOTE — TELEPHONE ENCOUNTER
xiomara patient is due for a refill on her BP medication  Her blood Pressure is still running high  Examples form Yesterday 134/118  139/116, 142/106  Says she is pushing water  Do you want to change anything  Please advise

## 2021-06-08 ENCOUNTER — OFFICE VISIT (OUTPATIENT)
Dept: FAMILY MEDICINE CLINIC | Age: 36
End: 2021-06-08
Payer: COMMERCIAL

## 2021-06-08 VITALS
SYSTOLIC BLOOD PRESSURE: 116 MMHG | HEART RATE: 102 BPM | WEIGHT: 290.4 LBS | TEMPERATURE: 97.3 F | HEIGHT: 65 IN | BODY MASS INDEX: 48.38 KG/M2 | DIASTOLIC BLOOD PRESSURE: 78 MMHG | OXYGEN SATURATION: 99 %

## 2021-06-08 DIAGNOSIS — Z00.00 WELL ADULT EXAM: Primary | ICD-10-CM

## 2021-06-08 DIAGNOSIS — I10 ESSENTIAL HYPERTENSION: ICD-10-CM

## 2021-06-08 DIAGNOSIS — M54.16 RIGHT LUMBAR RADICULOPATHY: ICD-10-CM

## 2021-06-08 DIAGNOSIS — J45.20 MILD INTERMITTENT ASTHMA WITHOUT COMPLICATION: ICD-10-CM

## 2021-06-08 DIAGNOSIS — E78.00 HYPERCHOLESTEREMIA: ICD-10-CM

## 2021-06-08 PROCEDURE — 99395 PREV VISIT EST AGE 18-39: CPT | Performed by: FAMILY MEDICINE

## 2021-06-08 RX ORDER — AMLODIPINE BESYLATE AND BENAZEPRIL HYDROCHLORIDE 10; 40 MG/1; MG/1
1 CAPSULE ORAL DAILY
Qty: 30 CAPSULE | Refills: 5 | Status: SHIPPED | OUTPATIENT
Start: 2021-06-08 | End: 2021-12-14

## 2021-06-08 NOTE — PROGRESS NOTES
2021    Wilburt Seip (:  1985) is a 38 y.o. female, here for a preventive medicine evaluation. Patient Active Problem List   Diagnosis    Asthma    Telogen effluvium    Migraine    Elevated blood pressure reading    Acute bronchitis    Colitis, collagenous   pap done   Labs 3/19 lipid/cmp okay  bp fluctuating - sitting in recliner, relaxed when taking  No problems/ side effects w/ med  No lightheaded / brain fog/ ha  Few migraines lately. Had spinal fusion - lumbar - has emg this week - doing PT w/ dry needling - moving toes and foot somewhat - using brace - using walker still to get around  Drop foot not doing better  Walker mainly for balance / support  Feeling like ankle is weak/ giving out on her on right   Sees surgeon next week after emg  Severe pain right leg since November - pain much better - no meds - occ sharp pain right great toe. Rare use of muscle relaxants/ pain meds  Still off work. Review of Systems    Prior to Visit Medications    Medication Sig Taking? Authorizing Provider   amLODIPine-benazepril (LOTREL) 10-20 MG per capsule Take 1 capsule by mouth daily Yes Sanjuanita Abarca MD   acetaminophen (TYLENOL) 325 MG tablet Take 975 mg by mouth every 8 hours Yes Historical Provider, MD   gabapentin (NEURONTIN) 300 MG capsule Take 300 mg by mouth every 8 hours.  Yes Historical Provider, MD   methocarbamol (ROBAXIN) 500 MG tablet  Yes Historical Provider, MD   oxyCODONE (ROXICODONE) 5 MG immediate release tablet  Yes Historical Provider, MD   Ascorbic Acid (HEBER-C PO) Take by mouth Yes Historical Provider, MD   albuterol sulfate HFA (VENTOLIN HFA) 108 (90 Base) MCG/ACT inhaler Inhale 2 puffs into the lungs every 4 hours as needed for Wheezing Yes Sanjuanita Abarca MD   senna-docusate (PERICOLACE) 8.6-50 MG per tablet Take 1 tablet by mouth 2 times daily  Patient not taking: Reported on 2021  Historical Provider, MD   diclofenac (VOLTAREN) 75 MG EC tablet   Historical Provider, MD   etodolac (LODINE) 400 MG tablet TAKE ONE TABLET BY MOUTH TWICE A DAY  Patient not taking: Reported on 2021  Elmer Momin MD        Allergies   Allergen Reactions    Other Nausea And Vomiting     vibrezi       Past Medical History:   Diagnosis Date    Asthma 2011    Colitis, collagenous 2016    Depression     History of chicken pox 1989    IBS (irritable bowel syndrome)     Telogen effluvium 2011       Past Surgical History:   Procedure Laterality Date     SECTION       SECTION      CHOLECYSTECTOMY  2010    WISDOM TOOTH EXTRACTION      APPROX 5 YRS AGO       Social History     Socioeconomic History    Marital status:      Spouse name: Not on file    Number of children: Not on file    Years of education: Not on file    Highest education level: Not on file   Occupational History    Not on file   Tobacco Use    Smoking status: Never Smoker    Smokeless tobacco: Never Used   Substance and Sexual Activity    Alcohol use: No    Drug use: No    Sexual activity: Not on file   Other Topics Concern    Not on file   Social History Narrative    Not on file     Social Determinants of Health     Financial Resource Strain: Medium Risk    Difficulty of Paying Living Expenses: Somewhat hard   Food Insecurity: No Food Insecurity    Worried About Running Out of Food in the Last Year: Never true    Morteza of Food in the Last Year: Never true   Transportation Needs: No Transportation Needs    Lack of Transportation (Medical): No    Lack of Transportation (Non-Medical):  No   Physical Activity:     Days of Exercise per Week:     Minutes of Exercise per Session:    Stress:     Feeling of Stress :    Social Connections:     Frequency of Communication with Friends and Family:     Frequency of Social Gatherings with Friends and Family:     Attends [employer]ism Services:     Active Member of Clubs or Organizations:     Attends Club or Organization Meetings:     Marital Status:    Intimate Partner Violence:     Fear of Current or Ex-Partner:     Emotionally Abused:     Physically Abused:     Sexually Abused:         Family History   Problem Relation Age of Onset    High Cholesterol Mother     High Blood Pressure Mother     Asthma Mother     Depression Mother     High Cholesterol Father     High Blood Pressure Father     Other Father         diverticulitis    Asthma Maternal Aunt     Depression Maternal Aunt     High Cholesterol Maternal Grandmother     High Blood Pressure Maternal Grandmother     Stroke Maternal Grandmother     Depression Maternal Grandmother     High Cholesterol Maternal Grandfather     High Blood Pressure Maternal Grandfather     Cancer Maternal Grandfather         LUNG CA    High Cholesterol Paternal Grandmother     High Blood Pressure Paternal Grandmother     High Cholesterol Paternal Grandfather     High Blood Pressure Paternal Grandfather     Cancer Paternal Grandfather         LUNG CANCER    Asthma Daughter        ADVANCE DIRECTIVE: N, <no information>    Vitals:    06/08/21 1601   BP: 116/78   Site: Left Upper Arm   Position: Sitting   Cuff Size: Large Adult   Pulse: 102   Temp: 97.3 °F (36.3 °C)   TempSrc: Temporal   SpO2: 99%   Weight: 290 lb 6.4 oz (131.7 kg)  Comment: shoes on   Height: 5' 5\" (1.651 m)     Estimated body mass index is 48.33 kg/m² as calculated from the following:    Height as of this encounter: 5' 5\" (1.651 m). Weight as of this encounter: 290 lb 6.4 oz (131.7 kg). Physical Exam  Constitutional:       General: She is not in acute distress. Appearance: She is well-developed. HENT:      Head: Normocephalic and atraumatic. Mouth/Throat:      Pharynx: No oropharyngeal exudate. Eyes:      General: No scleral icterus. Conjunctiva/sclera: Conjunctivae normal.   Neck:      Thyroid: No thyromegaly.    Cardiovascular:      Rate and Rhythm: Normal rate and regular rhythm. Heart sounds: Normal heart sounds. No murmur heard. Pulmonary:      Effort: Pulmonary effort is normal. No respiratory distress. Breath sounds: Normal breath sounds. No wheezing or rales. Abdominal:      General: Bowel sounds are normal. There is no distension. Palpations: Abdomen is soft. Tenderness: There is no abdominal tenderness. Lymphadenopathy:      Cervical: No cervical adenopathy. Skin:     General: Skin is warm and dry. Comments: Well healing lumbar incision   Neurological:      Mental Status: She is alert and oriented to person, place, and time. Comments: Weak DF >PF         No flowsheet data found. Lab Results   Component Value Date    CHOL 173 03/05/2019    CHOL 183 02/28/2018    CHOL 184 08/01/2016    TRIG 73 03/05/2019    TRIG 67 02/28/2018    TRIG 76 08/01/2016    HDL 53 03/05/2019    HDL 54 02/28/2018    HDL 42 08/01/2016    LDLCALC 105 03/05/2019    LDLCALC 116 02/28/2018    LDLCALC 127 08/01/2016    GLUCOSE 82 03/05/2019       The ASCVD Risk score (David Marlow, et al., 2013) failed to calculate for the following reasons:     The 2013 ASCVD risk score is only valid for ages 36 to 78    Immunization History   Administered Date(s) Administered    HPV Quadrivalent (Gardasil) 06/19/2008, 08/28/2008    Influenza Virus Vaccine 10/12/2009, 10/30/2016, 10/27/2019    Influenza, Quadv, IM, (6 mo and older Fluzone, Flulaval, Fluarix and 3 yrs and older Afluria) 10/01/2016    Influenza, Quadv, IM, PF (6 mo and older Fluzone, Flulaval, Fluarix, and 3 yrs and older Afluria) 09/01/2017    PPD Test 06/19/2008    Tdap (Boostrix, Adacel) 09/05/2012, 05/13/2019       Health Maintenance   Topic Date Due    Hepatitis C screen  Never done    Pneumococcal 0-64 years Vaccine (1 of 2 - PPSV23) Never done    COVID-19 Vaccine (1) Never done    Potassium monitoring  03/05/2020    Creatinine monitoring  03/05/2020    Flu vaccine (Season Ended) 09/01/2021    Cervical cancer screen  05/22/2023    DTaP/Tdap/Td vaccine (3 - Td or Tdap) 05/13/2029    HIV screen  Completed    Hepatitis A vaccine  Aged Out    Hepatitis B vaccine  Aged Out    Hib vaccine  Aged Out    Meningococcal (ACWY) vaccine  Aged Out    Varicella vaccine  Discontinued          ASSESSMENT/PLAN:   Diagnosis Orders   1. Well adult exam     2. Mild intermittent asthma without complication     3. Essential hypertension     holding on covid vaccine right now per surgeon  Flu shot in fall - utd on tdap  Diet/ activity dw/ pt  Holding on nsaids  Good hydration - good balanced diet - more fruit  Low salt d/ w pt - low sodium pop  Albuterol prn - not needing much  Increase lotrel 10/20 to 10/40 daily w/ monitoring as dbp still running around 90's  F/u spine surgeon after emg     An electronic signature was used to authenticate this note.     --Harry Myles MD on 6/8/2021 at 4:39 PM

## 2021-06-15 DIAGNOSIS — E78.00 HYPERCHOLESTEREMIA: ICD-10-CM

## 2021-06-15 DIAGNOSIS — I10 ESSENTIAL HYPERTENSION: ICD-10-CM

## 2021-06-15 LAB
A/G RATIO: 1.4 (ref 1.1–2.2)
ALBUMIN SERPL-MCNC: 4.1 G/DL (ref 3.4–5)
ALP BLD-CCNC: 95 U/L (ref 40–129)
ALT SERPL-CCNC: 6 U/L (ref 10–40)
ANION GAP SERPL CALCULATED.3IONS-SCNC: 13 MMOL/L (ref 3–16)
AST SERPL-CCNC: 8 U/L (ref 15–37)
BILIRUB SERPL-MCNC: 0.6 MG/DL (ref 0–1)
BUN BLDV-MCNC: 8 MG/DL (ref 7–20)
CALCIUM SERPL-MCNC: 9.4 MG/DL (ref 8.3–10.6)
CHLORIDE BLD-SCNC: 101 MMOL/L (ref 99–110)
CHOLESTEROL, TOTAL: 220 MG/DL (ref 0–199)
CO2: 26 MMOL/L (ref 21–32)
CREAT SERPL-MCNC: 0.6 MG/DL (ref 0.6–1.1)
GFR AFRICAN AMERICAN: >60
GFR NON-AFRICAN AMERICAN: >60
GLOBULIN: 2.9 G/DL
GLUCOSE BLD-MCNC: 85 MG/DL (ref 70–99)
HDLC SERPL-MCNC: 62 MG/DL (ref 40–60)
LDL CHOLESTEROL CALCULATED: 134 MG/DL
POTASSIUM SERPL-SCNC: 4.4 MMOL/L (ref 3.5–5.1)
SODIUM BLD-SCNC: 140 MMOL/L (ref 136–145)
TOTAL PROTEIN: 7 G/DL (ref 6.4–8.2)
TRIGL SERPL-MCNC: 119 MG/DL (ref 0–150)
VLDLC SERPL CALC-MCNC: 24 MG/DL

## 2021-09-09 ENCOUNTER — VIRTUAL VISIT (OUTPATIENT)
Dept: FAMILY MEDICINE CLINIC | Age: 36
End: 2021-09-09
Payer: COMMERCIAL

## 2021-09-09 DIAGNOSIS — L65.9 HAIR LOSS: ICD-10-CM

## 2021-09-09 DIAGNOSIS — M51.36 DDD (DEGENERATIVE DISC DISEASE), LUMBAR: ICD-10-CM

## 2021-09-09 DIAGNOSIS — I10 ESSENTIAL HYPERTENSION: Primary | ICD-10-CM

## 2021-09-09 PROCEDURE — G8417 CALC BMI ABV UP PARAM F/U: HCPCS | Performed by: FAMILY MEDICINE

## 2021-09-09 PROCEDURE — 99213 OFFICE O/P EST LOW 20 MIN: CPT | Performed by: FAMILY MEDICINE

## 2021-09-09 PROCEDURE — 1036F TOBACCO NON-USER: CPT | Performed by: FAMILY MEDICINE

## 2021-09-09 PROCEDURE — G8427 DOCREV CUR MEDS BY ELIG CLIN: HCPCS | Performed by: FAMILY MEDICINE

## 2021-09-09 NOTE — PROGRESS NOTES
mouth daily as needed. Yes Historical Provider, MD   Ascorbic Acid (HEBER-C PO) Take 1 tablet by mouth daily  Yes Historical Provider, MD   albuterol sulfate HFA (VENTOLIN HFA) 108 (90 Base) MCG/ACT inhaler Inhale 2 puffs into the lungs every 4 hours as needed for Wheezing Yes Pierre Quintana MD       Social History     Tobacco Use    Smoking status: Never Smoker    Smokeless tobacco: Never Used   Substance Use Topics    Alcohol use: No    Drug use: No        PHYSICAL EXAMINATION:  [ INSTRUCTIONS:  \"[x]\" Indicates a positive item  \"[]\" Indicates a negative item  -- DELETE ALL ITEMS NOT EXAMINED]  Vital Signs: (As obtained by patient/caregiver or practitioner observation)    Blood pressure-  Heart rate-    Respiratory rate-    Temperature-  Pulse oximetry-     Constitutional: [x] Appears well-developed and well-nourished [x] No apparent distress      [] Abnormal-   Mental status  [x] Alert and awake  [] Oriented to person/place/time []Able to follow commands      Eyes:  EOM    []  Normal  [] Abnormal-  Sclera  []  Normal  [] Abnormal -         Discharge []  None visible  [] Abnormal -    HENT:   [x] Normocephalic, atraumatic.   [] Abnormal   [] Mouth/Throat: Mucous membranes are moist.     External Ears [] Normal  [] Abnormal-     Neck: [] No visualized mass     Pulmonary/Chest: [x] Respiratory effort normal.  [] No visualized signs of difficulty breathing or respiratory distress        [] Abnormal-      Musculoskeletal:   [] Normal gait with no signs of ataxia         [] Normal range of motion of neck        [] Abnormal-       Neurological:        [x] No Facial Asymmetry (Cranial nerve 7 motor function) (limited exam to video visit)          [] No gaze palsy        [] Abnormal-         Skin:        [x] No significant exanthematous lesions or discoloration noted on facial skin         [] Abnormal-            Psychiatric:       [x] Normal Affect [] No Hallucinations        [] Abnormal-     Other pertinent observable physical exam findings-     ASSESSMENT/PLAN:  There are no diagnoses linked to this encounter. Diagnosis Orders   1. Essential hypertension     2. DDD (degenerative disc disease), lumbar     3. Hair loss       bp improved - cont to monitor and continue  lotrel 10/40 daily for bp  Biotin for hair loss - check tsh but suspect telogen effluvium  Cont PT/ spine surgery f/u -making slow but steady progress  Note for handicap placard for additional 6 months 2/2 lumbar issues  Check cmp, tsh w/ reflex on f/u visit around first of year. No follow-ups on file. Nicki Vega, was evaluated through a synchronous (real-time) audio-video encounter. The patient (or guardian if applicable) is aware that this is a billable service. Verbal consent to proceed has been obtained within the past 12 months. The visit was conducted pursuant to the emergency declaration under the [address] [place] [address], [address] [place] [address] authority and the [address] [employer] General Act. Patient identification was verified, and a caregiver was present when appropriate. The patient was located in a state where the provider was credentialed to provide care. Total time spent on this encounter: 11-20 minutes were spent on the digital evaluation and management of this patient. --Reji Beaver MD on 9/9/2021 at 10:43 AM    An electronic signature was used to authenticate this note.

## 2021-09-27 ENCOUNTER — PATIENT MESSAGE (OUTPATIENT)
Dept: FAMILY MEDICINE CLINIC | Age: 36
End: 2021-09-27

## 2021-09-27 NOTE — LETTER
[address] [id number]  Phone: [phone]  Fax: [phone] Henrique Inman MD         September 28, 2021     Patient: Miguel Cook   YOB: 1985   Date of Visit: 9/27/2021       To Whom It May Concern: It is my medical opinion that Mackenzie Mccullough requires a disability parking placard for the following reasons:  She cannot walk 200 feet without stopping to rest.  Duration of need: 6 months    If you have any questions or concerns, please don't hesitate to call.     Sincerely,        Durga France MD

## 2021-09-27 NOTE — TELEPHONE ENCOUNTER
From: Omari Brewer  To: Taylor Brewer MD  Sent: 9/27/2021 12:23 PM EDT  Subject: Non-Urgent Medical Question    Would you be able to send the handicap sticker for 6 months like we talked about over through my chart? Mine expires the end of the month.       Thanks

## 2021-10-26 ENCOUNTER — PATIENT MESSAGE (OUTPATIENT)
Dept: FAMILY MEDICINE CLINIC | Age: 36
End: 2021-10-26

## 2021-10-26 NOTE — TELEPHONE ENCOUNTER
From: BERNARDO CONNER  To: Hien Marcus  Sent: 10/26/2021 8:34 AM EDT  Subject: BLOODWORK ORDERS    On 9/9/21, you were given an order to schedule blood work. As of today, we have not received any test results. Please call our office to schedule an appointment for a nurse's visit as soon as possible. If you have already had this test performed or if you are no longer interested in the testing, please call our office staff.     ERIC Bañuelos

## 2021-10-28 ENCOUNTER — PATIENT MESSAGE (OUTPATIENT)
Dept: FAMILY MEDICINE CLINIC | Age: 36
End: 2021-10-28

## 2021-10-28 RX ORDER — ALBUTEROL SULFATE 90 UG/1
2 AEROSOL, METERED RESPIRATORY (INHALATION) EVERY 4 HOURS PRN
Qty: 1 EACH | Refills: 1 | Status: SHIPPED | OUTPATIENT
Start: 2021-10-28 | End: 2021-12-20 | Stop reason: SDUPTHER

## 2021-10-28 NOTE — TELEPHONE ENCOUNTER
From: Keiry Castaneda  To: Kady Barron MD  Sent: 10/28/2021 12:01 PM EDT  Subject: Prescription Question    When I met with you you asked about refill on inhaler. I thought about it and figured I better keep one on hand instead of needing it and not having. Would you be able to send prescription in to [facility] for me please?

## 2021-12-15 ENCOUNTER — PATIENT MESSAGE (OUTPATIENT)
Dept: FAMILY MEDICINE CLINIC | Age: 36
End: 2021-12-15

## 2021-12-15 RX ORDER — BUDESONIDE AND FORMOTEROL FUMARATE DIHYDRATE 160; 4.5 UG/1; UG/1
2 AEROSOL RESPIRATORY (INHALATION) 2 TIMES DAILY
Qty: 30.6 G | Refills: 1 | Status: SHIPPED | OUTPATIENT
Start: 2021-12-15 | End: 2022-02-25 | Stop reason: SDUPTHER

## 2021-12-15 RX ORDER — PREDNISONE 20 MG/1
40 TABLET ORAL
Qty: 10 TABLET | Refills: 0 | Status: SHIPPED | OUTPATIENT
Start: 2021-12-15 | End: 2021-12-20

## 2021-12-15 NOTE — TELEPHONE ENCOUNTER
From: Jose Carlos Pena  To: Dr. Tadeo Soto: 12/15/2021 2:12 PM EST  Subject: Non-Urgent Medical Question    I am having to use my inhaler multiple times a day this last week. I use to not use hardly at all. My chest feels tight and cough is real dry. I have tried over counter cough medicine and does nothing but inhaler does some just not enough. I am not running fever.

## 2021-12-17 ENCOUNTER — PATIENT MESSAGE (OUTPATIENT)
Dept: FAMILY MEDICINE CLINIC | Age: 36
End: 2021-12-17

## 2021-12-19 ENCOUNTER — PATIENT MESSAGE (OUTPATIENT)
Dept: FAMILY MEDICINE CLINIC | Age: 36
End: 2021-12-19

## 2021-12-20 RX ORDER — ALBUTEROL SULFATE 2.5 MG/3ML
2.5 SOLUTION RESPIRATORY (INHALATION) EVERY 6 HOURS PRN
Qty: 120 EACH | Refills: 1 | Status: SHIPPED | OUTPATIENT
Start: 2021-12-20 | End: 2022-02-25 | Stop reason: SDUPTHER

## 2021-12-20 RX ORDER — ALBUTEROL SULFATE 90 UG/1
2 AEROSOL, METERED RESPIRATORY (INHALATION) EVERY 4 HOURS PRN
Qty: 3 EACH | Refills: 3 | Status: SHIPPED | OUTPATIENT
Start: 2021-12-20 | End: 2022-02-25 | Stop reason: SDUPTHER

## 2021-12-20 NOTE — TELEPHONE ENCOUNTER
From: Jonel Elias  To: Dr. Rickie Sandifer: 12/19/2021 7:55 PM EST  Subject: Covid positive     I did an at home covid test and was positive. I assume you are not seeing patients that are with uour history. Do I need to be seen by someone else or get test at office ? My cough is still intense and prednisone is done tomorrow. Meredith Yi use to be on nebulizers would that help? We do not have any here anymore.

## 2021-12-21 ENCOUNTER — PATIENT MESSAGE (OUTPATIENT)
Dept: FAMILY MEDICINE CLINIC | Age: 36
End: 2021-12-21

## 2021-12-21 NOTE — TELEPHONE ENCOUNTER
From: BERNARDO ASHTON  To: Elsa Whitten  Sent: 12/21/2021 2:53 PM EST  Subject: NEBULIZER    Myna Medin,      We are able to get you a neb through us. I have one ready for . Someone else can pick it up for you as long as you are okay with them signing the form for you. Paula FORD

## 2021-12-22 ENCOUNTER — PATIENT MESSAGE (OUTPATIENT)
Dept: FAMILY MEDICINE CLINIC | Age: 36
End: 2021-12-22

## 2022-01-25 ENCOUNTER — PATIENT MESSAGE (OUTPATIENT)
Dept: FAMILY MEDICINE CLINIC | Age: 37
End: 2022-01-25

## 2022-01-25 NOTE — TELEPHONE ENCOUNTER
From: Derrick Atkins  To: Dr. Hilton Ice: 1/25/2022 8:32 AM EST  Subject: Blood work    Dr. Nereyda Lucio order blood work for me to get after the first of the year. I was hoping you could send me on here the work needed and I can go to the [facility] by me yo get it drawn please.

## 2022-01-27 DIAGNOSIS — I10 ESSENTIAL HYPERTENSION: ICD-10-CM

## 2022-01-27 DIAGNOSIS — L65.9 HAIR LOSS: ICD-10-CM

## 2022-01-27 LAB
A/G RATIO: 1.4 (ref 1.1–2.2)
ALBUMIN SERPL-MCNC: 4 G/DL (ref 3.4–5)
ALP BLD-CCNC: 83 U/L (ref 40–129)
ALT SERPL-CCNC: 8 U/L (ref 10–40)
ANION GAP SERPL CALCULATED.3IONS-SCNC: 13 MMOL/L (ref 3–16)
AST SERPL-CCNC: 10 U/L (ref 15–37)
BILIRUB SERPL-MCNC: 0.7 MG/DL (ref 0–1)
BUN BLDV-MCNC: 8 MG/DL (ref 7–20)
CALCIUM SERPL-MCNC: 9.1 MG/DL (ref 8.3–10.6)
CHLORIDE BLD-SCNC: 103 MMOL/L (ref 99–110)
CO2: 23 MMOL/L (ref 21–32)
CREAT SERPL-MCNC: 0.6 MG/DL (ref 0.6–1.1)
GFR AFRICAN AMERICAN: >60
GFR NON-AFRICAN AMERICAN: >60
GLUCOSE BLD-MCNC: 96 MG/DL (ref 70–99)
POTASSIUM SERPL-SCNC: 4 MMOL/L (ref 3.5–5.1)
SODIUM BLD-SCNC: 139 MMOL/L (ref 136–145)
TOTAL PROTEIN: 6.8 G/DL (ref 6.4–8.2)
TSH SERPL DL<=0.05 MIU/L-ACNC: 2.56 UIU/ML (ref 0.27–4.2)

## 2022-02-25 ENCOUNTER — OFFICE VISIT (OUTPATIENT)
Dept: FAMILY MEDICINE CLINIC | Age: 37
End: 2022-02-25
Payer: COMMERCIAL

## 2022-02-25 VITALS
WEIGHT: 279 LBS | BODY MASS INDEX: 46.48 KG/M2 | HEIGHT: 65 IN | HEART RATE: 88 BPM | OXYGEN SATURATION: 99 % | SYSTOLIC BLOOD PRESSURE: 122 MMHG | DIASTOLIC BLOOD PRESSURE: 76 MMHG

## 2022-02-25 DIAGNOSIS — R20.0 NUMBNESS OF RIGHT FOOT: ICD-10-CM

## 2022-02-25 DIAGNOSIS — I10 ESSENTIAL HYPERTENSION: Primary | ICD-10-CM

## 2022-02-25 DIAGNOSIS — R60.0 PEDAL EDEMA: ICD-10-CM

## 2022-02-25 DIAGNOSIS — K52.831 COLITIS, COLLAGENOUS: ICD-10-CM

## 2022-02-25 DIAGNOSIS — J45.40 MODERATE PERSISTENT ASTHMA, UNSPECIFIED WHETHER COMPLICATED: ICD-10-CM

## 2022-02-25 PROCEDURE — 1036F TOBACCO NON-USER: CPT | Performed by: FAMILY MEDICINE

## 2022-02-25 PROCEDURE — G8427 DOCREV CUR MEDS BY ELIG CLIN: HCPCS | Performed by: FAMILY MEDICINE

## 2022-02-25 PROCEDURE — G8484 FLU IMMUNIZE NO ADMIN: HCPCS | Performed by: FAMILY MEDICINE

## 2022-02-25 PROCEDURE — G8417 CALC BMI ABV UP PARAM F/U: HCPCS | Performed by: FAMILY MEDICINE

## 2022-02-25 PROCEDURE — 99214 OFFICE O/P EST MOD 30 MIN: CPT | Performed by: FAMILY MEDICINE

## 2022-02-25 RX ORDER — BUDESONIDE AND FORMOTEROL FUMARATE DIHYDRATE 160; 4.5 UG/1; UG/1
2 AEROSOL RESPIRATORY (INHALATION) 2 TIMES DAILY
Qty: 30.6 G | Refills: 1 | Status: SHIPPED | OUTPATIENT
Start: 2022-02-25 | End: 2022-07-05

## 2022-02-25 RX ORDER — ALBUTEROL SULFATE 2.5 MG/3ML
2.5 SOLUTION RESPIRATORY (INHALATION) EVERY 6 HOURS PRN
Qty: 180 EACH | Refills: 5 | Status: SHIPPED | OUTPATIENT
Start: 2022-02-25 | End: 2022-07-06 | Stop reason: SDUPTHER

## 2022-02-25 RX ORDER — AMLODIPINE BESYLATE AND BENAZEPRIL HYDROCHLORIDE 10; 40 MG/1; MG/1
CAPSULE ORAL
Qty: 90 CAPSULE | Refills: 3 | Status: SHIPPED | OUTPATIENT
Start: 2022-02-25

## 2022-02-25 RX ORDER — ALBUTEROL SULFATE 90 UG/1
2 AEROSOL, METERED RESPIRATORY (INHALATION) EVERY 4 HOURS PRN
Qty: 3 EACH | Refills: 1 | Status: SHIPPED | OUTPATIENT
Start: 2022-02-25

## 2022-02-25 ASSESSMENT — PATIENT HEALTH QUESTIONNAIRE - PHQ9
2. FEELING DOWN, DEPRESSED OR HOPELESS: 0
SUM OF ALL RESPONSES TO PHQ QUESTIONS 1-9: 0
1. LITTLE INTEREST OR PLEASURE IN DOING THINGS: 0
SUM OF ALL RESPONSES TO PHQ9 QUESTIONS 1 & 2: 0
SUM OF ALL RESPONSES TO PHQ QUESTIONS 1-9: 0

## 2022-02-25 NOTE — PROGRESS NOTES
[facility]  Clinic Note    Date: 2022                                               Subjective:     Chief Complaint   Patient presents with    Hypertension     HTN ROUTINE FOLLOW UP      HPI  BACK AT WORK  Off nearly a year  Had recent gyn appointment  Hx of lumbar fusion  Doing 2 treatments - clear stuff coming up from stomach.   Doesn't have time in day to do more -inhaler helps some but not as helpful as nebulizer - needs more tubing/ pipes    Goes off and on - has towels as coughing hard can induce this  Some pnd -not a lot  Coughs more around pool - but now happening more randomly    Some pain in low back if real active  Strength better in right ankle but not normal - chronic numbness right foot  More swelling right > left ankle - compression stockings help  Worsens through day - trying to limit salt -watching diet more    BP Readings from Last 3 Encounters:   22 122/76   21 116/78   21 (!) 128/90     Pulse Readings from Last 3 Encounters:   22 88   21 102   21 96     Wt Readings from Last 3 Encounters:   22 279 lb (126.6 kg)   21 290 lb 6.4 oz (131.7 kg)   21 281 lb (127.5 kg)            Patient Active Problem List    Diagnosis Date Noted    Essential hypertension 2021    Colitis, collagenous 2016    Migraine 2011    Asthma 2011    Telogen effluvium 2011     Past Medical History:   Diagnosis Date    Asthma 2011    Colitis, collagenous 2016    Depression     History of chicken pox 1989    IBS (irritable bowel syndrome)     Telogen effluvium 2011     Past Surgical History:   Procedure Laterality Date     SECTION       SECTION      CHOLECYSTECTOMY  2010    WISDOM TOOTH EXTRACTION      APPROX 5 YRS AGO     Orders Only on 2022   Component Date Value Ref Range Status    TSH 2022 2.56  0.27 - 4.20 uIU/mL Final     Family History   Problem Relation Age of Onset    High Cholesterol Mother     High Blood Pressure Mother     Asthma Mother     Depression Mother     High Cholesterol Father     High Blood Pressure Father     Other Father         diverticulitis    Asthma Maternal Aunt     Depression Maternal Aunt     High Cholesterol Maternal Grandmother     High Blood Pressure Maternal Grandmother     Stroke Maternal Grandmother     Depression Maternal Grandmother     High Cholesterol Maternal Grandfather     High Blood Pressure Maternal Grandfather     Cancer Maternal Grandfather         LUNG CA    High Cholesterol Paternal Grandmother     High Blood Pressure Paternal Grandmother     High Cholesterol Paternal Grandfather     High Blood Pressure Paternal Grandfather     Cancer Paternal Grandfather         LUNG CANCER    Asthma Daughter      Current Outpatient Medications   Medication Sig Dispense Refill    albuterol sulfate HFA (VENTOLIN HFA) 108 (90 Base) MCG/ACT inhaler Inhale 2 puffs into the lungs every 4 hours as needed for Wheezing 3 each 3    albuterol (PROVENTIL) (2.5 MG/3ML) 0.083% nebulizer solution Take 3 mLs by nebulization every 6 hours as needed for Wheezing or Shortness of Breath 120 each 1    budesonide-formoterol (SYMBICORT) 160-4.5 MCG/ACT AERO Inhale 2 puffs into the lungs 2 times daily 30.6 g 1    amLODIPine-benazepril (LOTREL) 10-40 MG per capsule TAKE ONE CAPSULE BY MOUTH DAILY 30 capsule 2    acetaminophen (TYLENOL) 325 MG tablet Take 975 mg by mouth every 8 hours      Ascorbic Acid (HEBER-C PO) Take 1 tablet by mouth daily        No current facility-administered medications for this visit.      Allergies   Allergen Reactions    Other Nausea And Vomiting     vibrezi       Review of Systems    Objective:  /76 (Site: Left Upper Arm, Position: Sitting, Cuff Size: Large Adult)   Pulse 88   Ht 5' 5\" (1.651 m)   Wt 279 lb (126.6 kg)   SpO2 99%   BMI 46.43 kg/m²     BP Readings from Last 3 Encounters: 02/25/22 122/76   06/08/21 116/78   05/06/21 (!) 128/90       Pulse Readings from Last 3 Encounters:   02/25/22 88   06/08/21 102   05/06/21 96       Wt Readings from Last 3 Encounters:   02/25/22 279 lb (126.6 kg)   06/08/21 290 lb 6.4 oz (131.7 kg)   04/13/21 281 lb (127.5 kg)       Physical Exam  Constitutional:       General: She is not in acute distress. Appearance: She is well-developed. HENT:      Head: Normocephalic and atraumatic. Mouth/Throat:      Pharynx: No oropharyngeal exudate. Eyes:      General: No scleral icterus. Conjunctiva/sclera: Conjunctivae normal.   Neck:      Thyroid: No thyromegaly. Cardiovascular:      Rate and Rhythm: Normal rate and regular rhythm. Pulses: Normal pulses. Heart sounds: Normal heart sounds. No murmur heard. Pulmonary:      Effort: Pulmonary effort is normal. No respiratory distress. Breath sounds: Normal breath sounds. No wheezing or rales. Abdominal:      General: Bowel sounds are normal. There is no distension. Palpations: Abdomen is soft. Tenderness: There is no abdominal tenderness. Musculoskeletal:         General: Swelling (trace ankles/ feet) present. Lymphadenopathy:      Cervical: No cervical adenopathy. Skin:     General: Skin is warm and dry. Neurological:      Mental Status: She is alert and oriented to person, place, and time. Assessment/Plan:      Diagnosis Orders   1. Essential hypertension     2. Colitis, collagenous     3. Moderate persistent asthma, unspecified whether complicated     4. Pedal edema     5.  Numbness of right foot       Diet/ activity /w eight loss plan dw/ pt  Cont HEP - increase activity as tolerated  Suspect venous insufficiency  Reduce salt/ elevate/ stay active/ hydrate well and compression stockings  Hold lasix unless worse  Colitis stable  bp stable on lotrel - refill done  Labs reviewed  F/u 6 months  Refer pulm as breathing seems worse -on symbicort and routine albuterol      Sigifredo Jack MD, MD  2/25/2022  10:02 AM

## 2022-04-05 ENCOUNTER — OFFICE VISIT (OUTPATIENT)
Dept: PULMONOLOGY | Age: 37
End: 2022-04-05
Payer: COMMERCIAL

## 2022-04-05 VITALS
BODY MASS INDEX: 46.76 KG/M2 | DIASTOLIC BLOOD PRESSURE: 66 MMHG | OXYGEN SATURATION: 97 % | SYSTOLIC BLOOD PRESSURE: 130 MMHG | WEIGHT: 281 LBS | HEART RATE: 80 BPM

## 2022-04-05 DIAGNOSIS — J30.2 SEASONAL ALLERGIC RHINITIS, UNSPECIFIED TRIGGER: Primary | ICD-10-CM

## 2022-04-05 DIAGNOSIS — J45.40 MODERATE PERSISTENT ASTHMA WITHOUT COMPLICATION: ICD-10-CM

## 2022-04-05 PROCEDURE — 1036F TOBACCO NON-USER: CPT | Performed by: INTERNAL MEDICINE

## 2022-04-05 PROCEDURE — 99204 OFFICE O/P NEW MOD 45 MIN: CPT | Performed by: INTERNAL MEDICINE

## 2022-04-05 PROCEDURE — G8417 CALC BMI ABV UP PARAM F/U: HCPCS | Performed by: INTERNAL MEDICINE

## 2022-04-05 PROCEDURE — G8427 DOCREV CUR MEDS BY ELIG CLIN: HCPCS | Performed by: INTERNAL MEDICINE

## 2022-04-05 RX ORDER — MONTELUKAST SODIUM 10 MG/1
10 TABLET ORAL DAILY
Qty: 30 TABLET | Refills: 3 | Status: SHIPPED | OUTPATIENT
Start: 2022-04-05 | End: 2022-08-15

## 2022-04-12 ENCOUNTER — HOSPITAL ENCOUNTER (OUTPATIENT)
Dept: PULMONOLOGY | Age: 37
Discharge: HOME | End: 2022-04-12
Payer: COMMERCIAL

## 2022-04-12 VITALS — HEART RATE: 90 BPM | OXYGEN SATURATION: 98 % | RESPIRATION RATE: 16 BRPM

## 2022-04-12 DIAGNOSIS — J45.40 MODERATE PERSISTENT ASTHMA WITHOUT COMPLICATION: ICD-10-CM

## 2022-04-12 LAB
DLCO %PRED: 56 %
DLCO PRED: NORMAL
DLCO/VA %PRED: NORMAL
DLCO/VA PRED: NORMAL
DLCO/VA: NORMAL
DLCO: NORMAL
EXPIRATORY TIME: NORMAL
FEF 25-75% %PRED-PRE: NORMAL
FEF 25-75% PRED: NORMAL
FEF 25-75%-PRE: NORMAL
FEV1 %PRED-PRE: 87 %
FEV1 PRED: NORMAL
FEV1/FVC %PRED-PRE: NORMAL
FEV1/FVC PRED: NORMAL
FEV1/FVC: 106 %
FEV1: NORMAL
FVC %PRED-PRE: NORMAL
FVC PRED: NORMAL
FVC: NORMAL
GAW %PRED: NORMAL
GAW PRED: NORMAL
GAW: NORMAL
IC %PRED: NORMAL
IC PRED: NORMAL
IC: NORMAL
MVV %PRED-PRE: NORMAL
MVV PRED: NORMAL
MVV-PRE: NORMAL
PEF %PRED-PRE: NORMAL
PEF PRED: NORMAL
PEF-PRE: NORMAL
RAW %PRED: NORMAL
RAW PRED: NORMAL
RAW: NORMAL
RV %PRED: NORMAL
RV PRED: NORMAL
RV: NORMAL
SVC %PRED: NORMAL
SVC PRED: NORMAL
SVC: NORMAL
TLC %PRED: 102 %
TLC PRED: NORMAL
TLC: NORMAL
VA %PRED: NORMAL
VA PRED: NORMAL
VA: NORMAL
VTG %PRED: NORMAL
VTG PRED: NORMAL
VTG: NORMAL

## 2022-04-12 PROCEDURE — 94200 LUNG FUNCTION TEST (MBC/MVV): CPT

## 2022-04-12 PROCEDURE — 94726 PLETHYSMOGRAPHY LUNG VOLUMES: CPT

## 2022-04-12 PROCEDURE — 94010 BREATHING CAPACITY TEST: CPT

## 2022-04-12 PROCEDURE — 94760 N-INVAS EAR/PLS OXIMETRY 1: CPT

## 2022-04-12 PROCEDURE — 94729 DIFFUSING CAPACITY: CPT

## 2022-04-12 RX ORDER — ALBUTEROL SULFATE 90 UG/1
4 AEROSOL, METERED RESPIRATORY (INHALATION) ONCE
Status: CANCELLED | OUTPATIENT
Start: 2022-04-12

## 2022-04-12 ASSESSMENT — PULMONARY FUNCTION TESTS
FEV1/FVC: 106
FEV1_PERCENT_PREDICTED_PRE: 87

## 2022-04-14 NOTE — PROCEDURES
Pulmonary Function Testing      Patient name:  Yani Padilla     [address] [place] [address] Unit #:   [id number]   Date of test:  4/12/2022  Date of interpretation:   4/14/2022    Ms. Yani Padilla is a 43y.o. year-old non smoker. The spirometry data were acceptable and reproducible. Spirometry:  Flow volume loops were normal. The FEV-1/FVC ratio was normal. The FEV-1 was 2.73 liters (87% of predicted), which was normal. The FVC was 3.12 liters (81% of predicted), which was normal. Response to inhaled bronchodilators (albuterol) was not performed. Lung volumes:  Lung volumes were tested by plethysmography. The total lung capacity was 5.35 liters (102% of predicted), which was normal. The residual volume was 2.24 liters (130% of predicted), which was increased. The ratio of residual volume to total lung capacity (RV/TLC) was 42, which was increased. Diffusion capacity was found to be 56% which is Moderately decreased. Interpretation:  Mild hyperinflation and moderate decrease in DLCO which corrects for alveolar volume. Clinical correlation is recommended.     Comments:

## 2022-05-09 ENCOUNTER — PATIENT MESSAGE (OUTPATIENT)
Dept: FAMILY MEDICINE CLINIC | Age: 37
End: 2022-05-09

## 2022-05-09 NOTE — LETTER
[address] [address]  Phone: [phone]  Fax: [phone]    Priya Medina MD        May 12, 2022    [place] [address]      To whom it may concern,                 Estelle Duque is not under any treatment for sleep apnea. If you have any questions or concerns, please don't hesitate to call.     Sincerely,        Priya Medina MD

## 2022-05-09 NOTE — LETTER
[address] [facility]  [address] [place] [address]  Phone: [phone]  Fax: [phone] [facility] MD Henny        May 12, 2022    [place] [address]      To whom it may concern,                  Chas Dillon is not under any treatment for sleep apnea. If you have any questions or concerns, please don't hesitate to call.     Sincerely,        Vishal Young MD

## 2022-05-10 NOTE — TELEPHONE ENCOUNTER
Okay to do letter as I do not treat her for sleep apnea- sees pulm, but not sure if seeing sleep medicine for david

## 2022-05-10 NOTE — TELEPHONE ENCOUNTER
From: Chaitanya Winslow  To: Dr. Shon Horvath: 5/9/2022 7:38 PM EDT  Subject: Letter     My work has requested that I get a note stating that I am not being treated by you for sleep apnea. I accidentally marked box on my form and fixed it but they now need letter from family doctor.

## 2022-05-13 ENCOUNTER — PATIENT MESSAGE (OUTPATIENT)
Dept: FAMILY MEDICINE CLINIC | Age: 37
End: 2022-05-13

## 2022-05-13 RX ORDER — NEBULIZER ACCESSORIES
1 KIT MISCELLANEOUS DAILY PRN
Qty: 1 KIT | Refills: 0 | Status: SHIPPED | OUTPATIENT
Start: 2022-05-13

## 2022-05-13 NOTE — TELEPHONE ENCOUNTER
From: Wanda Le  To: Dr. Espinosa Ear: 5/13/2022 7:20 AM EDT  Subject: Equipment     I know Malina Cerna said she could get me tubing and mouth pieces for my nebulizer but I didn't need until now. It is starting to give me some issues. Can I  from office or better yet call into some place closer to me? Also the cruiseline is asking me to bring a note from doctor that I need to take my nebulizer machine with me but am safe to travel. We leave [place] [address].

## 2022-05-13 NOTE — LETTER
[address] [address]  Phone: [phone]  Fax: [phone] Henrique Imnan MD        May 17, 2022    [place] [address]      To whom it may concern,                    It is in my medical opinion that Maya Neely should be aloud to travel with her nebulizer. She is safe to travel but will need to keep her nebulizer machine with her on the plane. If you have any questions or concerns, please don't hesitate to call.     Sincerely,        Bimal Morales MD

## 2022-05-16 ENCOUNTER — TELEPHONE (OUTPATIENT)
Dept: ADMINISTRATIVE | Age: 37
End: 2022-05-16

## 2022-05-16 NOTE — TELEPHONE ENCOUNTER
Submitted PA for Zachariah Diez   Via [employer] Key: [id number] STATUS: CANCELLED. LETTER ATTACHED. If this requires a response please respond to the pool. [address]). Please advise patient thank you.

## 2022-07-05 RX ORDER — BUDESONIDE AND FORMOTEROL FUMARATE DIHYDRATE 160; 4.5 UG/1; UG/1
AEROSOL RESPIRATORY (INHALATION)
Qty: 30.6 G | Refills: 1 | Status: SHIPPED | OUTPATIENT
Start: 2022-07-05

## 2022-07-06 ENCOUNTER — OFFICE VISIT (OUTPATIENT)
Dept: PULMONOLOGY | Age: 37
End: 2022-07-06
Payer: COMMERCIAL

## 2022-07-06 VITALS
WEIGHT: 177 LBS | OXYGEN SATURATION: 96 % | BODY MASS INDEX: 29.45 KG/M2 | DIASTOLIC BLOOD PRESSURE: 72 MMHG | SYSTOLIC BLOOD PRESSURE: 120 MMHG | HEART RATE: 84 BPM

## 2022-07-06 DIAGNOSIS — J30.2 SEASONAL ALLERGIC RHINITIS, UNSPECIFIED TRIGGER: ICD-10-CM

## 2022-07-06 DIAGNOSIS — J45.40 MODERATE PERSISTENT ASTHMA WITHOUT COMPLICATION: Primary | ICD-10-CM

## 2022-07-06 PROCEDURE — 99214 OFFICE O/P EST MOD 30 MIN: CPT | Performed by: INTERNAL MEDICINE

## 2022-07-06 PROCEDURE — 1036F TOBACCO NON-USER: CPT | Performed by: INTERNAL MEDICINE

## 2022-07-06 PROCEDURE — G8417 CALC BMI ABV UP PARAM F/U: HCPCS | Performed by: INTERNAL MEDICINE

## 2022-07-06 PROCEDURE — G8427 DOCREV CUR MEDS BY ELIG CLIN: HCPCS | Performed by: INTERNAL MEDICINE

## 2022-07-06 RX ORDER — ALBUTEROL SULFATE 2.5 MG/3ML
2.5 SOLUTION RESPIRATORY (INHALATION) EVERY 6 HOURS PRN
Qty: 180 EACH | Refills: 5 | Status: SHIPPED | OUTPATIENT
Start: 2022-07-06

## 2022-07-06 NOTE — PROGRESS NOTES
PULMONARY OFFICE FOLLOW UP NOTE    REASON FOR VISIT:   Chief Complaint   Patient presents with    Follow-up    Results       DATE OF VISIT: 7/6/2022    HISTORY OF PRESENT ILLNESS: 43y.o. year old female is here for follow-up of asthma and allergic rhinitis. She has past medical history of hypertension. Patient's bronchodilator regimen consist of Symbicort. She has been taking Symbicort every other day. She also use albuterol inhaler and albuterol nebulizer as needed. She has not felt the need to use albuterol recently. Denies any shortness of breath. She is struggling with uncontrolled allergies in spite of taking Singulair every day. Patient is not able to take nasal sprays due to strong gag reflex. Previously:  Patient states that she was diagnosed with asthma when she was in her 25s. At some point she had used steroid inhalers as well as were getting allergy shots. Her asthma and allergies were done under control and she was not taking inhalers consistently. She was only using albuterol inhaler as needed. She developed COVID in December 2021 which was managed at home. Symptoms resolved except shortness of breath and cough. She her primary care physician started her on Symbicort which she has been taking twice a day along with albuterol nebulization and albuterol inhaler to be used as needed. Patient has noticed improvement in coughing but has been struggling with cough. Cough is mostly dry and comes in spells and she ends up throwing up after the coughing spells. Cough does not wakes her up from sleep. Cough is persistent and is not getting better. Patient also has environmental allergies mostly to dust, dust mites and cats. She has runny nose with sinus congestion and postnasal drip which is uncontrolled. She does not have any pets. She is a lifelong non-smoker. She is a .     Diagnostic test reviewed:  I reviewed the PFT from 4/15/2022 and my interpretation is as follows. Decreased diffusion capacity. Normal spirometry lung volumes. FEV1/FVC 88  FEV1 87%, 2.73 L  FVC 81%, 3.12 L  %, 5.34 L  DLCO 56%    REVIEW OF SYSTEMS:   CONSTITUTIONAL SYMPTOMS: The patient denies fever, fatigue, night sweats, weight loss or weight gain. HEENT: No vision changes. No tinnitus, Denies sinus pain. No hoarseness, or dysphagia. NECK: Patient denies swelling in the neck. CARDIOVASCULAR: Denies chest pain, palpitation, syncope. RESPIRATORY: See above. GASTROINTESTINAL: Denies nausea, abdominal pain or change in bowel function. GENITOURINARY: Denies obstructive symptoms. No history of incontinence. BREASTS: No masses or lumps in the breasts. SKIN: No rashes or itching. MUSCULOSKELETAL: Denies weakness or bone pain. NEUROLOGICAL: No headaches or seizures. PSYCHIATRIC: Denies mood swings or depression. ENDOCRINE: Denies heat or cold intolerance or excessive thirst.  HEMATOLOGIC/LYMPHATIC: Denies easy bruising or lymph node swelling. ALLERGIC/IMMUNOLOGIC: No environmental allergies.     PAST MEDICAL HISTORY:   Past Medical History:   Diagnosis Date    Asthma 2011    Colitis, collagenous 2016    Depression     History of chicken pox 1989    IBS (irritable bowel syndrome)     Telogen effluvium 2011       PAST SURGICAL HISTORY:   Past Surgical History:   Procedure Laterality Date     SECTION       SECTION      CHOLECYSTECTOMY  2010    WISDOM TOOTH EXTRACTION      APPROX 5 YRS AGO       SOCIAL HISTORY:   Social History     Tobacco Use    Smoking status: Never Smoker    Smokeless tobacco: Never Used   Substance Use Topics    Alcohol use: No    Drug use: No       FAMILY HISTORY:   Family History   Problem Relation Age of Onset    High Cholesterol Mother     High Blood Pressure Mother     Asthma Mother     Depression Mother     High Cholesterol Father     High Blood Pressure Father     Other Father diverticulitis    Asthma Maternal Aunt     Depression Maternal Aunt     High Cholesterol Maternal Grandmother     High Blood Pressure Maternal Grandmother     Stroke Maternal Grandmother     Depression Maternal Grandmother     High Cholesterol Maternal Grandfather     High Blood Pressure Maternal Grandfather     Cancer Maternal Grandfather         LUNG CA    High Cholesterol Paternal Grandmother     High Blood Pressure Paternal Grandmother     High Cholesterol Paternal Grandfather     High Blood Pressure Paternal Grandfather     Cancer Paternal Grandfather         LUNG CANCER    Asthma Daughter        MEDICATIONS:     Current Outpatient Medications on File Prior to Visit   Medication Sig Dispense Refill    SYMBICORT 160-4.5 MCG/ACT AERO INHALE TWO PUFFS BY MOUTH TWICE A DAY 30.6 g 1    Respiratory Therapy Supplies (NEBULIZER MASK ADULT) MISC Use as directed for nebulizer treatment 1 each 5    Respiratory Therapy Supplies (NEBULIZER/TUBING/MOUTHPIECE) KIT 1 kit by Does not apply route daily as needed (w/ nebulizer treatment) 1 kit 0    Cholecalciferol (VITAMIN D3) 125 MCG (5000 UT) TABS Take 5,000 tablets by mouth      montelukast (SINGULAIR) 10 MG tablet Take 1 tablet by mouth daily 30 tablet 3    albuterol sulfate HFA (VENTOLIN HFA) 108 (90 Base) MCG/ACT inhaler Inhale 2 puffs into the lungs every 4 hours as needed for Wheezing or Shortness of Breath 3 each 1    amLODIPine-benazepril (LOTREL) 10-40 MG per capsule TAKE ONE CAPSULE BY MOUTH DAILY 90 capsule 3    acetaminophen (TYLENOL) 325 MG tablet Take 975 mg by mouth every 8 hours      Ascorbic Acid (HEBER-C PO) Take 1 tablet by mouth daily        No current facility-administered medications on file prior to visit. ALLERGIES:   Allergies as of 07/06/2022 - Fully Reviewed 07/06/2022   Allergen Reaction Noted    Other Nausea And Vomiting 12/02/2016      OBJECTIVE:   weight is 177 lb (80.3 kg).  Her blood pressure is 120/72 and her pulse is 84. Her oxygen saturation is 96%. PHYSICAL EXAM:    CONSTITUTIONAL: She is a 43y.o.-year-old who appears her stated age. She is alert and oriented x 3 and in no acute distress. HEENT: PERRL. No scleral icterus. No thrush, atraumatic, normocephalic. NECK: Supple, without cervical or supraclavicular lymphadenopathy:  CARDIOVASCULAR: S1 S2 RRR. Without murmer  RESPIRATORY & CHEST: Lungs are clear to auscultation and percussion. No wheezing, no crackles. Good air movement  GASTROINTESTINAL & ABDOMEN: Soft, nontender, positive bowel sounds in all quadrants, non-distended, without hepatosplenomegaly. GENITOURINARY: Deferred. MUSCULOSKELETAL: No tenderness to palpation of the axial skeleton. There is no clubbing. No cyanosis. No edema of the lower extremities. SKIN OF BODY: No rash or jaundice. PSYCHIATRIC EVALUATION: Normal affect. Patient answers questions appropriately. HEMATOLOGIC/LYMPHATIC/ IMMUNOLOGIC: No palpable lymphadenopathy. NEUROLOGIC: Alert and oriented x 3. Groslly non-focal. Motor strength is 5+/5 in all muscle groups. The patient has a normal sensorium globally. ASSESSMENT AND PLAN:     1. Moderate persistent asthma without complication  Continue Symbicort twice daily  Continue albuterol inhaler and albuterol nebs as needed. - albuterol (PROVENTIL) (2.5 MG/3ML) 0.083% nebulizer solution; Take 3 mLs by nebulization every 6 hours as needed for Wheezing or Shortness of Breath  Dispense: 180 each; Refill: 5    2. Seasonal allergic rhinitis, unspecified trigger  Patient likely has uncontrolled allergies with postnasal drip leading to cough. She is not able to tolerate nasal sprays because of a strong gag reflex and vomiting. Continue singulair. Start claritin daily. Return in about 4 months (around 11/6/2022).        Reji Kim MD  Pulmonary Critical Care and Sleep Medicine  Electronically signed by Reji Kim MD on 7/6/2022 at 11:15 AM     This note was completed using dragon medical speech recognition software. Grammatical errors, random word insertions, pronoun errors and incomplete sentences are occasional consequences of this technology due to software limitations. If there are questions or concerns about the content of this note of information contained within the body of this dictation they should be addressed with the provider for clarification.

## 2022-08-15 DIAGNOSIS — J30.2 SEASONAL ALLERGIC RHINITIS, UNSPECIFIED TRIGGER: ICD-10-CM

## 2022-08-15 RX ORDER — MONTELUKAST SODIUM 10 MG/1
TABLET ORAL
Qty: 30 TABLET | Refills: 3 | Status: SHIPPED | OUTPATIENT
Start: 2022-08-15

## 2022-09-19 ENCOUNTER — E-VISIT (OUTPATIENT)
Dept: FAMILY MEDICINE CLINIC | Age: 37
End: 2022-09-19
Payer: MEDICAID

## 2022-09-19 DIAGNOSIS — R05.9 COUGH: Primary | ICD-10-CM

## 2022-09-19 PROCEDURE — 99421 OL DIG E/M SVC 5-10 MIN: CPT | Performed by: FAMILY MEDICINE

## 2022-09-19 RX ORDER — METHYLPREDNISOLONE 4 MG/1
TABLET ORAL
Qty: 1 KIT | Refills: 0 | Status: SHIPPED | OUTPATIENT
Start: 2022-09-19 | End: 2022-09-25

## 2022-09-23 ENCOUNTER — HOSPITAL ENCOUNTER (OUTPATIENT)
Age: 37
Discharge: HOME | End: 2022-09-23
Payer: MEDICAID

## 2022-09-23 ENCOUNTER — HOSPITAL ENCOUNTER (OUTPATIENT)
Dept: GENERAL RADIOLOGY | Age: 37
Discharge: HOME | End: 2022-09-23
Payer: MEDICAID

## 2022-09-23 ENCOUNTER — OFFICE VISIT (OUTPATIENT)
Dept: FAMILY MEDICINE CLINIC | Age: 37
End: 2022-09-23
Payer: MEDICAID

## 2022-09-23 VITALS
SYSTOLIC BLOOD PRESSURE: 126 MMHG | BODY MASS INDEX: 45.15 KG/M2 | HEART RATE: 82 BPM | WEIGHT: 271 LBS | OXYGEN SATURATION: 99 % | DIASTOLIC BLOOD PRESSURE: 82 MMHG | HEIGHT: 65 IN

## 2022-09-23 DIAGNOSIS — E66.01 CLASS 3 SEVERE OBESITY DUE TO EXCESS CALORIES WITH SERIOUS COMORBIDITY AND BODY MASS INDEX (BMI) OF 45.0 TO 49.9 IN ADULT (HCC): ICD-10-CM

## 2022-09-23 DIAGNOSIS — M79.672 INTRACTABLE LEFT HEEL PAIN: ICD-10-CM

## 2022-09-23 DIAGNOSIS — J45.41 MODERATE PERSISTENT ASTHMA WITH EXACERBATION: ICD-10-CM

## 2022-09-23 DIAGNOSIS — G43.909 MIGRAINE WITHOUT STATUS MIGRAINOSUS, NOT INTRACTABLE, UNSPECIFIED MIGRAINE TYPE: ICD-10-CM

## 2022-09-23 DIAGNOSIS — Z00.00 WELL ADULT EXAM: Primary | ICD-10-CM

## 2022-09-23 DIAGNOSIS — K52.831 COLITIS, COLLAGENOUS: ICD-10-CM

## 2022-09-23 DIAGNOSIS — I10 ESSENTIAL HYPERTENSION: ICD-10-CM

## 2022-09-23 PROCEDURE — 99395 PREV VISIT EST AGE 18-39: CPT | Performed by: FAMILY MEDICINE

## 2022-09-23 PROCEDURE — 71046 X-RAY EXAM CHEST 2 VIEWS: CPT

## 2022-09-23 RX ORDER — PROMETHAZINE HYDROCHLORIDE AND CODEINE PHOSPHATE 6.25; 1 MG/5ML; MG/5ML
5 SYRUP ORAL 4 TIMES DAILY PRN
COMMUNITY

## 2022-09-23 RX ORDER — PREDNISONE 10 MG/1
TABLET ORAL
Qty: 25 TABLET | Refills: 0 | Status: SHIPPED | OUTPATIENT
Start: 2022-09-23

## 2022-09-23 NOTE — PROGRESS NOTES
2022    Shayy Lay (:  1985) is a 43 y.o. female, here for a preventive medicine evaluation. Chief Complaint   Patient presents with    Annual Exam     ANNUAL ROUTINE PHYSICAL ASTHMA IS REALLY BAD HAS BEEN FOR WEEKS NOTHING IS HELPING MEDROL DOSE PACK AND COUGH SYRUP DO NOT HELP      A LOT OF CONGESTION/ DRAINAGE IN NOSE FOR 1 WEEK  WENT TO URGENT CARE  STREP NEGATIVE  USING INHALERS AND GIVEN COUGH MED - NOT SLEEPING 2/2 COUGH  Tight in chest now but really bad w/ wheezing in evening / night  Clear to yellow mucus. 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 Back manageable - chronic low back pain  Bowels okay generally - collagenous colitis  Told to come back after 38 for colonoscopy    BP Readings from Last 3 Encounters:   22 126/82   22 120/72   22 130/66     Pulse Readings from Last 3 Encounters:   22 82   22 84   22 90     Wt Readings from Last 3 Encounters:   22 271 lb (122.9 kg)   22 177 lb (80.3 kg)   22 281 lb (127.5 kg)       Patient Active Problem List   Diagnosis    Asthma    Telogen effluvium    Migraine    Colitis, collagenous    Essential hypertension       Review of Systems    Prior to Visit Medications    Medication Sig Taking? Authorizing Provider   promethazine-codeine (PHENERGAN WITH CODEINE) 6.25-10 MG/5ML syrup Take 5 mLs by mouth 4 times daily as needed for Cough.  Yes Historical Provider, MD   methylPREDNISolone (MEDROL DOSEPACK) 4 MG tablet Take by mouth.  Yes SHIRIN Matta - CNP   montelukast (SINGULAIR) 10 MG tablet TAKE ONE TABLET BY MOUTH DAILY Yes Ronnie Taveras MD   albuterol (PROVENTIL) (2.5 MG/3ML) 0.083% nebulizer solution Take 3 mLs by nebulization every 6 hours as needed for Wheezing or Shortness of Breath Yes Ronnie Taveras MD   SYMBICORT 160-4.5 MCG/ACT AERO INHALE TWO PUFFS BY MOUTH TWICE A DAY Yes Miriam Benjamin MD   Respiratory Therapy Supplies (NEBULIZER MASK ADULT) [address] [facility] Use as directed for nebulizer treatment Yes Miriam Benjamin MD   Respiratory Therapy Supplies (NEBULIZER/TUBING/MOUTHPIECE) KIT 1 kit by Does not apply route daily as needed (w/ nebulizer treatment) Yes Miriam Benjamin MD   Cholecalciferol (VITAMIN D3) 125 MCG (5000 UT) TABS Take 5,000 tablets by mouth Yes Historical Provider, MD   albuterol sulfate HFA (VENTOLIN HFA) 108 (90 Base) MCG/ACT inhaler Inhale 2 puffs into the lungs every 4 hours as needed for Wheezing or Shortness of Breath Yes Miriam Benjamin MD   amLODIPine-benazepril (LOTREL) 10-40 MG per capsule TAKE ONE CAPSULE BY MOUTH DAILY Yes Miriam Benjamin MD   acetaminophen (TYLENOL) 325 MG tablet Take 975 mg by mouth every 8 hours Yes Historical Provider, MD   Ascorbic Acid (HEBER-C PO) Take 1 tablet by mouth daily  Yes Historical Provider, MD        Allergies   Allergen Reactions    Other Nausea And Vomiting     vibrezi       Past Medical History:   Diagnosis Date    Asthma 2011    Colitis, collagenous 2016    Depression     History of chicken pox 1989    IBS (irritable bowel syndrome)     Telogen effluvium 2011       Past Surgical History:   Procedure Laterality Date     SECTION       SECTION      CHOLECYSTECTOMY  2010    WISDOM TOOTH EXTRACTION      APPROX 5 YRS AGO       Social History     Socioeconomic History    Marital status:      Spouse name: Not on file    Number of children: Not on file    Years of education: Not on file    Highest education level: Not on file   Occupational History    Not on file   Tobacco Use    Smoking status: Never    Smokeless tobacco: Never   Substance and Sexual Activity    Alcohol use: No    Drug use: No    Sexual activity: Not on file   Other Topics Concern    Not on file   Social History Narrative    Not on file     Social Determinants of Health     Financial Resource Strain: Not on file   Food Insecurity: Not on file   Transportation Needs: Not on file   Physical Activity: Not on file   Stress: Not on file   Social Connections: Not on file   Intimate Partner Violence: Not on file   Housing Stability: Not on file        Family History   Problem Relation Age of Onset    High Cholesterol Mother     High Blood Pressure Mother     Asthma Mother     Depression Mother     High Cholesterol Father     High Blood Pressure Father     Other Father         diverticulitis    Asthma Maternal Aunt     Depression Maternal Aunt     High Cholesterol Maternal Grandmother     High Blood Pressure Maternal Grandmother     Stroke Maternal Grandmother     Depression Maternal Grandmother     High Cholesterol Maternal Grandfather     High Blood Pressure Maternal Grandfather     Cancer Maternal Grandfather         LUNG CA    High Cholesterol Paternal Grandmother     High Blood Pressure Paternal Grandmother     High Cholesterol Paternal Grandfather     High Blood Pressure Paternal Grandfather     Cancer Paternal Grandfather         LUNG CANCER    Asthma Daughter        ADVANCE DIRECTIVE: N, <no information>    Vitals:    09/23/22 0815   BP: 126/82   Site: Left Upper Arm   Position: Sitting   Cuff Size: Large Adult   Pulse: 82   SpO2: 99%   Weight: 271 lb (122.9 kg)   Height: 5' 5\" (1.651 m)     Estimated body mass index is 45.1 kg/m² as calculated from the following:    Height as of this encounter: 5' 5\" (1.651 m). Weight as of this encounter: 271 lb (122.9 kg). Physical Exam  Constitutional:       General: She is not in acute distress.      Appearance: She is well-developed. HENT:      Head: Normocephalic and atraumatic. Mouth/Throat:      Pharynx: No oropharyngeal exudate. Eyes:      General: No scleral icterus. Conjunctiva/sclera: Conjunctivae normal.   Neck:      Thyroid: No thyromegaly. Cardiovascular:      Rate and Rhythm: Normal rate and regular rhythm. Heart sounds: Normal heart sounds. No murmur heard. Pulmonary:      Effort: Pulmonary effort is normal. No respiratory distress. Breath sounds: No wheezing or rales. Comments: Decreased bs left lung - few wheezes  Abdominal:      General: Bowel sounds are normal. There is no distension. Palpations: Abdomen is soft. Tenderness: There is no abdominal tenderness. Musculoskeletal:         General: No swelling (trace ankles). Comments: Swelling/ pain posterior calcaneus - from   Lymphadenopathy:      Cervical: No cervical adenopathy. Skin:     General: Skin is warm and dry. Neurological:      Mental Status: She is alert and oriented to person, place, and time. No flowsheet data found. Lab Results   Component Value Date/Time    CHOL 220 06/15/2021 09:06 AM    CHOL 173 03/05/2019 11:05 AM    CHOL 183 02/28/2018 01:28 PM    TRIG 119 06/15/2021 09:06 AM    TRIG 73 03/05/2019 11:05 AM    TRIG 67 02/28/2018 01:28 PM    HDL 62 06/15/2021 09:06 AM    HDL 53 03/05/2019 11:05 AM    HDL 54 02/28/2018 01:28 PM    LDLCALC 134 06/15/2021 09:06 AM    LDLCALC 105 03/05/2019 11:05 AM    LDLCALC 116 02/28/2018 01:28 PM    GLUCOSE 96 01/27/2022 08:53 AM       The ASCVD Risk score (Jeralene Brunner., et al., 2013) failed to calculate for the following reasons:     The 2013 ASCVD risk score is only valid for ages 36 to 78    Immunization History   Administered Date(s) Administered    COVID-19, PFIZER PURPLE top, DILUTE for use, (age 15 y+), 30mcg/0.3mL 09/26/2021, 10/17/2021    HPV Quadrivalent (Gardasil) 06/19/2008, 08/28/2008    Influenza Virus Vaccine 10/12/2009, 10/30/2016, 09/01/2017, 10/27/2019, 01/30/2020    Influenza, AFLURIA (age 1 yrs+), FLUZONE, (age 10 mo+), MDV, 0.5mL 10/01/2016    Influenza, FLUARIX, FLULAVAL, FLUZONE (age 10 mo+) AND AFLURIA, (age 1 y+), PF, 0.5mL 09/01/2017    Influenza, FLUBLOK, (age 25 y+), PF, 0.5mL 10/27/2019    PPD Test 06/19/2008    Td (Adult), 5 Lf Tetanus Toxoid, Pf (Tenivac, Decavac) 05/13/2019    Tdap (Boostrix, Adacel) 09/05/2012, 05/13/2019       Health Maintenance   Topic Date Due    Pneumococcal 0-64 years Vaccine (1 - PCV) Never done    Hepatitis C screen  Never done    COVID-19 Vaccine (3 - Booster for "[employer] series) 03/17/2022    Flu vaccine (1) 09/01/2022    Depression Screen  02/25/2023    DTaP/Tdap/Td vaccine (3 - Td or Tdap) 05/13/2029    HIV screen  Completed    Hepatitis A vaccine  Aged Out    Hepatitis B vaccine  Aged Out    Hib vaccine  Aged Out    Meningococcal (ACWY) vaccine  Aged Out    Varicella vaccine  Discontinued       Assessment & Plan       Diagnosis Orders   1. Well adult exam        2. Moderate persistent asthma with exacerbation        3. Colitis, collagenous        4. Migraine without status migrainosus, not intractable, unspecified migraine type        5. Essential hypertension        6. Class 3 severe obesity due to excess calories with serious comorbidity and body mass index (BMI) of 45.0 to 49.9 in adult ([facility] [address]        7.  Intractable left heel pain        Check cxr  Bp stable on lotrel  Singulair daily w/ symbicort/ albuterol via hfa/ neb  Routine gyn check  Mmg age 34, colon ca screen 43 d/w pt -had colonoscopy 2016  Diet/ activity d/w pt  Encourage flu/ pneumovax - utd on tdap  Covid booster  Tsh, cmp okay 1/22  Lipid 6/21 - okay w/ hdl 62  Stretching for heel w/ voltaren gel/ ice and minimizing pressure on back of heel  Labs w/ routine f/u yearly - sooner pending asthma  F/u pending cxr - consider prednisone taper    --Will Nguyen MD

## 2024-02-23 NOTE — PATIENT INSTRUCTIONS
Instructions.\"  Current as of: September 20, 2023               Content Version: 13.9  © 9857-8553 [employer].   Care instructions adapted under license by [employer]. If you have questions about a medical condition or this instruction, always ask your healthcare professional. [employer] disclaims any warranty or liability for your use of this information.           Well Visit, Ages 18 to 65: Care Instructions  Well visits can help you stay healthy. Your doctor has checked your overall health and may have suggested ways to take good care of yourself. Your doctor also may have recommended tests. You can help prevent illness with healthy eating, good sleep, vaccinations, regular exercise, and other steps.    Get the tests that you and your doctor decide on. Depending on your age and risks, examples might include screening for diabetes; hepatitis C; HIV; and cervical, breast, lung, and colon cancer. Screening helps find diseases before any symptoms appear.   Eat healthy foods. Choose fruits, vegetables, whole grains, lean protein, and low-fat dairy foods. Limit saturated fat and reduce salt.     Limit alcohol. Men should have no more than 2 drinks a day. Women should have no more than 1. For some people, no alcohol is the best choice.   Exercise. Get at least 30 minutes of exercise on most days of the week. Walking can be a good choice.     Reach and stay at your healthy weight. This will lower your risk for many health problems.   Take care of your mental health. Try to stay connected with friends, family, and community, and find ways to manage stress.     If you're feeling depressed or hopeless, talk to someone. A counselor can help. If you don't have a counselor, talk to your doctor.   Talk to your doctor if you think you may have a problem with alcohol or drug use. This includes prescription medicines, marijuana, and other drugs.     Avoid tobacco and nicotine: Don't smoke, vape, or

## 2024-02-26 ENCOUNTER — OFFICE VISIT (OUTPATIENT)
Dept: FAMILY MEDICINE CLINIC | Age: 39
End: 2024-02-26
Payer: COMMERCIAL

## 2024-02-26 VITALS
HEIGHT: 65 IN | SYSTOLIC BLOOD PRESSURE: 120 MMHG | HEART RATE: 92 BPM | OXYGEN SATURATION: 97 % | DIASTOLIC BLOOD PRESSURE: 78 MMHG | WEIGHT: 265.4 LBS | BODY MASS INDEX: 44.22 KG/M2

## 2024-02-26 DIAGNOSIS — R53.82 CHRONIC FATIGUE: ICD-10-CM

## 2024-02-26 DIAGNOSIS — I10 ESSENTIAL HYPERTENSION: ICD-10-CM

## 2024-02-26 DIAGNOSIS — Z00.00 ENCOUNTER FOR WELL ADULT EXAM WITHOUT ABNORMAL FINDINGS: Primary | ICD-10-CM

## 2024-02-26 DIAGNOSIS — Z13.220 SCREENING CHOLESTEROL LEVEL: ICD-10-CM

## 2024-02-26 PROCEDURE — 3078F DIAST BP <80 MM HG: CPT

## 2024-02-26 PROCEDURE — 3074F SYST BP LT 130 MM HG: CPT

## 2024-02-26 PROCEDURE — 99213 OFFICE O/P EST LOW 20 MIN: CPT

## 2024-02-26 PROCEDURE — 99396 PREV VISIT EST AGE 40-64: CPT

## 2024-02-26 SDOH — ECONOMIC STABILITY: FOOD INSECURITY: WITHIN THE PAST 12 MONTHS, YOU WORRIED THAT YOUR FOOD WOULD RUN OUT BEFORE YOU GOT MONEY TO BUY MORE.: NEVER TRUE

## 2024-02-26 SDOH — ECONOMIC STABILITY: HOUSING INSECURITY
IN THE LAST 12 MONTHS, WAS THERE A TIME WHEN YOU DID NOT HAVE A STEADY PLACE TO SLEEP OR SLEPT IN A SHELTER (INCLUDING NOW)?: NO

## 2024-02-26 SDOH — ECONOMIC STABILITY: FOOD INSECURITY: WITHIN THE PAST 12 MONTHS, THE FOOD YOU BOUGHT JUST DIDN'T LAST AND YOU DIDN'T HAVE MONEY TO GET MORE.: NEVER TRUE

## 2024-02-26 SDOH — ECONOMIC STABILITY: INCOME INSECURITY: HOW HARD IS IT FOR YOU TO PAY FOR THE VERY BASICS LIKE FOOD, HOUSING, MEDICAL CARE, AND HEATING?: NOT HARD AT ALL

## 2024-02-26 ASSESSMENT — ENCOUNTER SYMPTOMS
RHINORRHEA: 0
ABDOMINAL DISTENTION: 0
COUGH: 0
CONSTIPATION: 0
BACK PAIN: 0
ABDOMINAL PAIN: 0
TROUBLE SWALLOWING: 0
SORE THROAT: 0
CHEST TIGHTNESS: 0
PHOTOPHOBIA: 0
WHEEZING: 0
COLOR CHANGE: 0
VOMITING: 0
DIARRHEA: 0
SHORTNESS OF BREATH: 0
NAUSEA: 0

## 2024-02-26 ASSESSMENT — PATIENT HEALTH QUESTIONNAIRE - PHQ9
SUM OF ALL RESPONSES TO PHQ QUESTIONS 1-9: 0
1. LITTLE INTEREST OR PLEASURE IN DOING THINGS: 0
SUM OF ALL RESPONSES TO PHQ9 QUESTIONS 1 & 2: 0
2. FEELING DOWN, DEPRESSED OR HOPELESS: 0
SUM OF ALL RESPONSES TO PHQ QUESTIONS 1-9: 0

## 2024-02-26 NOTE — PROGRESS NOTES
Well Adult Note  Name: Maddy Rogel Today’s Date: 2024   MRN: [id number] Sex: Female   Age: 38 y.o. Ethnicity: Non- / Non    : 1985 Race: White (non-)      Maddy Rogel is here for well adult exam.  History:  She has been doing well. She is fatigued often, a lot with her family, but potentially more. She would like blood work. She has had low vitamin D and iron in the past. 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 She has been sleeping okay, could be better. She would like to be in bed by 10p, getting up at 3a or 6a. She can fall asleep easily, occasionally wakes up but easily falls back asleep. She does snore. She had sleep apnea in the past, doing better without the machine. She has gained some weight since then. She drinks a lot of caffeine, 3 bottles of Mtw Dew, plus 2 cans a day. She is working on cutting back on pop. She takes a [employer] shower in the mornings. Mood has not been great, not a fan of her job as well. Company not treating staff well. She sees her therapist every 4-6 weeks. 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 Working 45 hours a week. She likes watching movies and [facility] [employer], going out with friends a few times a week. Asthma is okay, usually flares in the spring, Symbicort helps. She does not have time for intentional exercise. She goes for 10k steps a day, takes her kids at work on walks.       Review of Systems   Constitutional:  Positive for fatigue. Negative for activity change, appetite change, chills, diaphoresis and unexpected weight change.   HENT:  Negative for congestion, dental problem, postnasal drip, rhinorrhea, sneezing, sore throat and trouble swallowing.    Eyes:  Negative for photophobia and visual disturbance.   Respiratory:  Negative for cough, chest tightness, shortness of breath and wheezing.    Cardiovascular:  Negative for chest pain, palpitations and leg swelling.   Gastrointestinal:  Negative for

## 2024-04-16 DIAGNOSIS — Z13.220 SCREENING CHOLESTEROL LEVEL: ICD-10-CM

## 2024-04-16 DIAGNOSIS — R53.82 CHRONIC FATIGUE: ICD-10-CM

## 2024-04-16 LAB
ALBUMIN SERPL-MCNC: 3.8 G/DL (ref 3.4–5)
ALBUMIN/GLOB SERPL: 1.4 {RATIO} (ref 1.1–2.2)
ALP SERPL-CCNC: 88 U/L (ref 40–129)
ALT SERPL-CCNC: 7 U/L (ref 10–40)
ANION GAP SERPL CALCULATED.3IONS-SCNC: 11 MMOL/L (ref 3–16)
AST SERPL-CCNC: 10 U/L (ref 15–37)
BASOPHILS # BLD: 0.1 K/UL (ref 0–0.2)
BASOPHILS NFR BLD: 0.6 %
BILIRUB SERPL-MCNC: 0.9 MG/DL (ref 0–1)
BUN SERPL-MCNC: 6 MG/DL (ref 7–20)
CALCIUM SERPL-MCNC: 9 MG/DL (ref 8.3–10.6)
CHLORIDE SERPL-SCNC: 102 MMOL/L (ref 99–110)
CHOLEST SERPL-MCNC: 194 MG/DL (ref 0–199)
CO2 SERPL-SCNC: 24 MMOL/L (ref 21–32)
CREAT SERPL-MCNC: 0.5 MG/DL (ref 0.6–1.1)
DEPRECATED RDW RBC AUTO: 13.1 % (ref 12.4–15.4)
EOSINOPHIL # BLD: 0.1 K/UL (ref 0–0.6)
EOSINOPHIL NFR BLD: 1.2 %
GFR SERPLBLD CREATININE-BSD FMLA CKD-EPI: >90 ML/MIN/{1.73_M2}
GLUCOSE SERPL-MCNC: 82 MG/DL (ref 70–99)
HCT VFR BLD AUTO: 39.1 % (ref 36–48)
HDLC SERPL-MCNC: 51 MG/DL (ref 40–60)
HGB BLD-MCNC: 13.2 G/DL (ref 12–16)
IRON SATN MFR SERPL: 17 % (ref 15–50)
IRON SERPL-MCNC: 56 UG/DL (ref 37–145)
LDLC SERPL CALC-MCNC: 127 MG/DL
LYMPHOCYTES # BLD: 3.3 K/UL (ref 1–5.1)
LYMPHOCYTES NFR BLD: 36.7 %
MCH RBC QN AUTO: 29.6 PG (ref 26–34)
MCHC RBC AUTO-ENTMCNC: 33.6 G/DL (ref 31–36)
MCV RBC AUTO: 88.1 FL (ref 80–100)
MONOCYTES # BLD: 0.6 K/UL (ref 0–1.3)
MONOCYTES NFR BLD: 6.2 %
NEUTROPHILS # BLD: 4.9 K/UL (ref 1.7–7.7)
NEUTROPHILS NFR BLD: 55.3 %
PLATELET # BLD AUTO: 293 K/UL (ref 135–450)
PMV BLD AUTO: 9.4 FL (ref 5–10.5)
POTASSIUM SERPL-SCNC: 4.1 MMOL/L (ref 3.5–5.1)
PROT SERPL-MCNC: 6.5 G/DL (ref 6.4–8.2)
RBC # BLD AUTO: 4.44 M/UL (ref 4–5.2)
SODIUM SERPL-SCNC: 137 MMOL/L (ref 136–145)
T4 FREE SERPL-MCNC: 1.2 NG/DL (ref 0.9–1.8)
TIBC SERPL-MCNC: 325 UG/DL (ref 260–445)
TRIGL SERPL-MCNC: 81 MG/DL (ref 0–150)
TSH SERPL DL<=0.005 MIU/L-ACNC: 3.22 UIU/ML (ref 0.27–4.2)
VLDLC SERPL CALC-MCNC: 16 MG/DL
WBC # BLD AUTO: 8.9 K/UL (ref 4–11)

## 2025-01-14 ENCOUNTER — TELEPHONE (OUTPATIENT)
Dept: FAMILY MEDICINE CLINIC | Age: 40
End: 2025-01-14

## 2025-01-14 NOTE — TELEPHONE ENCOUNTER
----- Message from Princess SAWYER sent at 1/14/2025  2:18 PM EST -----  Regarding: E[facility] Appointment Request  E[facility] Appointment Request    Patient needs appointment for E[facility] Appointment Type: ED Follow-Up.    Patient Requested Dates(s): ASAP  Patient Requested Time: No preferred time   Provider Name: Dr. Glischinski, Luke A, APRN - CNP and Dr. Maylin Capps, APRN-CNP    Reason for Appointment Request: Established Patient - Available appointments did not meet patient need. Patient wants to schedule an appointment for her ED follow as she fell on ice and needs to have a follow up visit   --------------------------------------------------------------------------------------------------------------------------    Relationship to Patient: Self     Call Back Information: OK to leave message on voicemail  Preferred Call Back Number: Phone [phone]

## 2025-02-17 ENCOUNTER — OFFICE VISIT (OUTPATIENT)
Dept: FAMILY MEDICINE CLINIC | Age: 40
End: 2025-02-17
Payer: COMMERCIAL

## 2025-02-17 VITALS
SYSTOLIC BLOOD PRESSURE: 124 MMHG | WEIGHT: 271 LBS | HEART RATE: 91 BPM | OXYGEN SATURATION: 98 % | DIASTOLIC BLOOD PRESSURE: 80 MMHG | HEIGHT: 65 IN | BODY MASS INDEX: 45.15 KG/M2

## 2025-02-17 DIAGNOSIS — S96.911S STRAIN OF RIGHT ANKLE, SEQUELA: ICD-10-CM

## 2025-02-17 DIAGNOSIS — J45.40 MODERATE PERSISTENT ASTHMA WITHOUT COMPLICATION: ICD-10-CM

## 2025-02-17 DIAGNOSIS — Z13.1 SCREENING FOR DIABETES MELLITUS: ICD-10-CM

## 2025-02-17 DIAGNOSIS — Z12.31 ENCOUNTER FOR SCREENING MAMMOGRAM FOR MALIGNANT NEOPLASM OF BREAST: ICD-10-CM

## 2025-02-17 DIAGNOSIS — Z13.220 SCREENING CHOLESTEROL LEVEL: ICD-10-CM

## 2025-02-17 DIAGNOSIS — Z00.00 ENCOUNTER FOR WELL ADULT EXAM WITHOUT ABNORMAL FINDINGS: Primary | ICD-10-CM

## 2025-02-17 DIAGNOSIS — R53.82 CHRONIC FATIGUE: ICD-10-CM

## 2025-02-17 DIAGNOSIS — F51.04 PSYCHOPHYSIOLOGIC INSOMNIA: ICD-10-CM

## 2025-02-17 LAB
25(OH)D3 SERPL-MCNC: 25.5 NG/ML
ALBUMIN SERPL-MCNC: 4.2 G/DL (ref 3.4–5)
ALBUMIN/GLOB SERPL: 1.5 {RATIO} (ref 1.1–2.2)
ALP SERPL-CCNC: 87 U/L (ref 40–129)
ALT SERPL-CCNC: 13 U/L (ref 10–40)
ANION GAP SERPL CALCULATED.3IONS-SCNC: 11 MMOL/L (ref 3–16)
AST SERPL-CCNC: 14 U/L (ref 15–37)
BASOPHILS # BLD: 0.1 K/UL (ref 0–0.2)
BASOPHILS NFR BLD: 1.3 %
BILIRUB SERPL-MCNC: 1 MG/DL (ref 0–1)
BUN SERPL-MCNC: 12 MG/DL (ref 7–20)
CALCIUM SERPL-MCNC: 9.4 MG/DL (ref 8.3–10.6)
CHLORIDE SERPL-SCNC: 104 MMOL/L (ref 99–110)
CHOLEST SERPL-MCNC: 201 MG/DL (ref 0–199)
CO2 SERPL-SCNC: 25 MMOL/L (ref 21–32)
CREAT SERPL-MCNC: 0.7 MG/DL (ref 0.6–1.1)
DEPRECATED RDW RBC AUTO: 12.7 % (ref 12.4–15.4)
EOSINOPHIL # BLD: 0.2 K/UL (ref 0–0.6)
EOSINOPHIL NFR BLD: 2.2 %
EST. AVERAGE GLUCOSE BLD GHB EST-MCNC: 91.1 MG/DL
GFR SERPLBLD CREATININE-BSD FMLA CKD-EPI: >90 ML/MIN/{1.73_M2}
GLUCOSE SERPL-MCNC: 85 MG/DL (ref 70–99)
HBA1C MFR BLD: 4.8 %
HCT VFR BLD AUTO: 41.3 % (ref 36–48)
HDLC SERPL-MCNC: 51 MG/DL (ref 40–60)
HGB BLD-MCNC: 13.9 G/DL (ref 12–16)
IRON SERPL-MCNC: 63 UG/DL (ref 37–145)
LDLC SERPL CALC-MCNC: 136 MG/DL
LYMPHOCYTES # BLD: 2.8 K/UL (ref 1–5.1)
LYMPHOCYTES NFR BLD: 36.2 %
MCH RBC QN AUTO: 30.7 PG (ref 26–34)
MCHC RBC AUTO-ENTMCNC: 33.8 G/DL (ref 31–36)
MCV RBC AUTO: 90.7 FL (ref 80–100)
MONOCYTES # BLD: 0.5 K/UL (ref 0–1.3)
MONOCYTES NFR BLD: 6.2 %
NEUTROPHILS # BLD: 4.1 K/UL (ref 1.7–7.7)
NEUTROPHILS NFR BLD: 54.1 %
PLATELET # BLD AUTO: 275 K/UL (ref 135–450)
PMV BLD AUTO: 9.4 FL (ref 5–10.5)
POTASSIUM SERPL-SCNC: 4.4 MMOL/L (ref 3.5–5.1)
PROT SERPL-MCNC: 7 G/DL (ref 6.4–8.2)
RBC # BLD AUTO: 4.55 M/UL (ref 4–5.2)
SODIUM SERPL-SCNC: 140 MMOL/L (ref 136–145)
TRIGL SERPL-MCNC: 71 MG/DL (ref 0–150)
TSH SERPL DL<=0.005 MIU/L-ACNC: 2.63 UIU/ML (ref 0.27–4.2)
VLDLC SERPL CALC-MCNC: 14 MG/DL
WBC # BLD AUTO: 7.6 K/UL (ref 4–11)

## 2025-02-17 PROCEDURE — 3079F DIAST BP 80-89 MM HG: CPT

## 2025-02-17 PROCEDURE — 3074F SYST BP LT 130 MM HG: CPT

## 2025-02-17 PROCEDURE — 99213 OFFICE O/P EST LOW 20 MIN: CPT

## 2025-02-17 PROCEDURE — 99396 PREV VISIT EST AGE 40-64: CPT

## 2025-02-17 RX ORDER — ALBUTEROL SULFATE 0.83 MG/ML
2.5 SOLUTION RESPIRATORY (INHALATION) EVERY 6 HOURS PRN
Qty: 180 EACH | Refills: 5 | Status: SHIPPED | OUTPATIENT
Start: 2025-02-17

## 2025-02-17 RX ORDER — OXYCODONE HYDROCHLORIDE 5 MG/1
5 TABLET ORAL EVERY 4 HOURS PRN
COMMUNITY
Start: 2025-01-14

## 2025-02-17 RX ORDER — IBUPROFEN 800 MG/1
800 TABLET, FILM COATED ORAL EVERY 6 HOURS PRN
COMMUNITY
Start: 2025-01-14

## 2025-02-17 RX ORDER — BUDESONIDE AND FORMOTEROL FUMARATE DIHYDRATE 160; 4.5 UG/1; UG/1
2 AEROSOL RESPIRATORY (INHALATION) 2 TIMES DAILY
Qty: 30.6 G | Refills: 3 | Status: SHIPPED | OUTPATIENT
Start: 2025-02-17

## 2025-02-17 SDOH — ECONOMIC STABILITY: FOOD INSECURITY: WITHIN THE PAST 12 MONTHS, YOU WORRIED THAT YOUR FOOD WOULD RUN OUT BEFORE YOU GOT MONEY TO BUY MORE.: NEVER TRUE

## 2025-02-17 SDOH — ECONOMIC STABILITY: FOOD INSECURITY: WITHIN THE PAST 12 MONTHS, THE FOOD YOU BOUGHT JUST DIDN'T LAST AND YOU DIDN'T HAVE MONEY TO GET MORE.: NEVER TRUE

## 2025-02-17 ASSESSMENT — ENCOUNTER SYMPTOMS
SINUS PAIN: 0
COUGH: 0
CHEST TIGHTNESS: 0
CONSTIPATION: 0
NAUSEA: 0
ABDOMINAL DISTENTION: 0
SORE THROAT: 0
EYE DISCHARGE: 0
TROUBLE SWALLOWING: 0
PHOTOPHOBIA: 0
EYE ITCHING: 0
DIARRHEA: 0
BACK PAIN: 1
COLOR CHANGE: 0
ABDOMINAL PAIN: 0
EYE REDNESS: 0
WHEEZING: 1
SHORTNESS OF BREATH: 1
RHINORRHEA: 1
SINUS PRESSURE: 0

## 2025-02-17 ASSESSMENT — PATIENT HEALTH QUESTIONNAIRE - PHQ9
SUM OF ALL RESPONSES TO PHQ QUESTIONS 1-9: 1
1. LITTLE INTEREST OR PLEASURE IN DOING THINGS: NOT AT ALL
SUM OF ALL RESPONSES TO PHQ QUESTIONS 1-9: 1
2. FEELING DOWN, DEPRESSED OR HOPELESS: SEVERAL DAYS
SUM OF ALL RESPONSES TO PHQ QUESTIONS 1-9: 1
SUM OF ALL RESPONSES TO PHQ9 QUESTIONS 1 & 2: 1
SUM OF ALL RESPONSES TO PHQ9 QUESTIONS 1 & 2: 1
SUM OF ALL RESPONSES TO PHQ QUESTIONS 1-9: 1
2. FEELING DOWN, DEPRESSED OR HOPELESS: SEVERAL DAYS
1. LITTLE INTEREST OR PLEASURE IN DOING THINGS: NOT AT ALL

## 2025-02-17 NOTE — PATIENT INSTRUCTIONS
Use birth control if it's important to you to prevent pregnancy. Talk with your doctor about your choices and what might be best for you.   Prevent problems where you can. Protect your skin from too much sun, wash your hands, brush your teeth twice a day, and wear a seat belt in the car.   Where can you learn more?  Go to [email or web address]://[email or web address].net/patientEd and enter P072 to learn more about \"Well Visit, Ages 18 to 65: Care Instructions.\"  Current as of: April 30, 2024  Content Version: 14.3  © 2024 [employer].   Care instructions adapted under license by [employer]. If you have questions about a medical condition or this instruction, always ask your healthcare professional. [employer] disclaims any warranty or liability for your use of this information.

## 2025-02-17 NOTE — PROGRESS NOTES
Well Adult Note  Name: Maddy Rogel Today’s Date: 2025   MRN: [id number] Sex: Female   Age: 39 y.o. Ethnicity: Non- / Non    : 1985 Race: White (non-)      Maddy Rogel is here for a well adult exam.       Assessment & Plan   Assessment & Plan  Encounter for well adult exam without abnormal findings  -Personal medical history, surgical history, family history reviewed.  Medications reconciled.  -Care gaps addressed.  Agreeable to screenings, declined vaccinations today.  -Age-appropriate healthcare topics discussed.  -Educated on office policies and procedures.  -Follow-up in 12 months for physical with fasting blood work       Moderate persistent asthma without complication   Chronic, not at goal (unstable), worsening recently with weather and dust  -Encouraged taking Symbicort as prescribed, twice daily instead of daily  -Refills of medications sent to pharmacy  -Follow-up annually, or sooner as needed  Orders:    budesonide-formoterol (SYMBICORT) 160-4.5 MCG/ACT AERO; Inhale 2 puffs into the lungs 2 times daily    albuterol (PROVENTIL) (2.5 MG/3ML) 0.083% nebulizer solution; Take 3 mLs by nebulization every 6 hours as needed for Wheezing or Shortness of Breath    Chronic fatigue   Chronic, not at goal (unstable), worsened again with less physical activity, less water  -Encourage increased physical activity as tolerated R/T ankle injury, increase water intake  -Health goals: Weight loss goal of 5-10% of your current body weight with 1 pound of weight loss per week; drink at least 64 ounces of water a day, exercise at least 150 minutes/week, walk more than 7,000 steps daily, sleep between 7 to 9 hours nightly, consume approximately 2,000 calories daily, consume all calories within a 10-hour window daily, limit fatty, fried, and ultra-processed foods in the diet, and limit toxins in the diet (alcohol, drugs, smoking).  -Assess blood work for secondary

## 2025-02-17 NOTE — ASSESSMENT & PLAN NOTE
Chronic, not at goal (unstable), worsening recently with weather and dust  -Encouraged taking Symbicort as prescribed, twice daily instead of daily  -Refills of medications sent to pharmacy  -Follow-up annually, or sooner as needed  Orders:    budesonide-formoterol (SYMBICORT) 160-4.5 MCG/ACT AERO; Inhale 2 puffs into the lungs 2 times daily    albuterol (PROVENTIL) (2.5 MG/3ML) 0.083% nebulizer solution; Take 3 mLs by nebulization every 6 hours as needed for Wheezing or Shortness of Breath

## 2025-08-17 ENCOUNTER — HOSPITAL ENCOUNTER (EMERGENCY)
Age: 40
Discharge: HOME | End: 2025-08-17
Attending: EMERGENCY MEDICINE
Payer: COMMERCIAL

## 2025-08-17 ENCOUNTER — APPOINTMENT (OUTPATIENT)
Age: 40
End: 2025-08-17
Payer: COMMERCIAL

## 2025-08-17 VITALS
HEIGHT: 65 IN | DIASTOLIC BLOOD PRESSURE: 76 MMHG | BODY MASS INDEX: 48.82 KG/M2 | WEIGHT: 293 LBS | HEART RATE: 88 BPM | OXYGEN SATURATION: 97 % | RESPIRATION RATE: 16 BRPM | TEMPERATURE: 98.3 F | SYSTOLIC BLOOD PRESSURE: 119 MMHG

## 2025-08-17 DIAGNOSIS — S80.12XA CONTUSION OF LEFT LOWER EXTREMITY, INITIAL ENCOUNTER: ICD-10-CM

## 2025-08-17 DIAGNOSIS — S93.402A SPRAIN OF LEFT ANKLE, UNSPECIFIED LIGAMENT, INITIAL ENCOUNTER: Primary | ICD-10-CM

## 2025-08-17 PROCEDURE — 73590 X-RAY EXAM OF LOWER LEG: CPT

## 2025-08-17 PROCEDURE — 6370000000 HC RX 637 (ALT 250 FOR IP): Performed by: EMERGENCY MEDICINE

## 2025-08-17 PROCEDURE — 99283 EMERGENCY DEPT VISIT LOW MDM: CPT

## 2025-08-17 RX ORDER — IBUPROFEN 200 MG
600 TABLET ORAL EVERY 8 HOURS PRN
Qty: 60 TABLET | Refills: 0 | Status: SHIPPED | OUTPATIENT
Start: 2025-08-17

## 2025-08-17 RX ORDER — ACETAMINOPHEN 500 MG
1000 TABLET ORAL ONCE
Status: COMPLETED | OUTPATIENT
Start: 2025-08-17 | End: 2025-08-17

## 2025-08-17 RX ORDER — CYCLOBENZAPRINE HCL 10 MG
10 TABLET ORAL 3 TIMES DAILY PRN
Qty: 20 TABLET | Refills: 0 | Status: SHIPPED | OUTPATIENT
Start: 2025-08-17 | End: 2025-08-27

## 2025-08-17 RX ORDER — CYCLOBENZAPRINE HCL 10 MG
10 TABLET ORAL ONCE
Status: COMPLETED | OUTPATIENT
Start: 2025-08-17 | End: 2025-08-17

## 2025-08-17 RX ADMIN — CYCLOBENZAPRINE 10 MG: 10 TABLET, FILM COATED ORAL at 11:59

## 2025-08-17 RX ADMIN — ACETAMINOPHEN 1000 MG: 500 TABLET ORAL at 11:59

## 2025-08-17 ASSESSMENT — PAIN DESCRIPTION - DESCRIPTORS: DESCRIPTORS: ACHING;STABBING

## 2025-08-17 ASSESSMENT — LIFESTYLE VARIABLES
HOW MANY STANDARD DRINKS CONTAINING ALCOHOL DO YOU HAVE ON A TYPICAL DAY: 1 OR 2
HOW OFTEN DO YOU HAVE A DRINK CONTAINING ALCOHOL: MONTHLY OR LESS

## 2025-08-17 ASSESSMENT — PAIN DESCRIPTION - ORIENTATION: ORIENTATION: LEFT

## 2025-08-17 ASSESSMENT — PAIN DESCRIPTION - LOCATION: LOCATION: LEG

## 2025-08-17 ASSESSMENT — PAIN - FUNCTIONAL ASSESSMENT
PAIN_FUNCTIONAL_ASSESSMENT: 0-10
PAIN_FUNCTIONAL_ASSESSMENT: 0-10

## 2025-08-17 ASSESSMENT — PAIN SCALES - GENERAL: PAINLEVEL_OUTOF10: 6

## 2025-08-20 ENCOUNTER — OFFICE VISIT (OUTPATIENT)
Age: 40
End: 2025-08-20

## 2025-08-20 VITALS — WEIGHT: 293 LBS | BODY MASS INDEX: 48.82 KG/M2 | HEIGHT: 65 IN

## 2025-08-20 DIAGNOSIS — S86.912A KNEE STRAIN, LEFT, INITIAL ENCOUNTER: ICD-10-CM

## 2025-08-20 DIAGNOSIS — M25.562 ACUTE PAIN OF LEFT KNEE: ICD-10-CM

## 2025-08-20 DIAGNOSIS — S93.402A MODERATE LEFT ANKLE SPRAIN, INITIAL ENCOUNTER: Primary | ICD-10-CM

## 2025-09-04 ENCOUNTER — OFFICE VISIT (OUTPATIENT)
Age: 40
End: 2025-09-04
Payer: COMMERCIAL

## 2025-09-04 VITALS — WEIGHT: 293 LBS | BODY MASS INDEX: 48.82 KG/M2 | HEIGHT: 65 IN

## 2025-09-04 DIAGNOSIS — M79.605 PAIN AND SWELLING OF LEFT LOWER EXTREMITY: Primary | ICD-10-CM

## 2025-09-04 DIAGNOSIS — M79.89 PAIN AND SWELLING OF LEFT LOWER EXTREMITY: Primary | ICD-10-CM

## 2025-09-04 DIAGNOSIS — S93.402D MODERATE LEFT ANKLE SPRAIN, SUBSEQUENT ENCOUNTER: ICD-10-CM

## 2025-09-04 PROCEDURE — 99203 OFFICE O/P NEW LOW 30 MIN: CPT | Performed by: ORTHOPAEDIC SURGERY

## 2025-09-04 RX ORDER — DICLOFENAC SODIUM 75 MG/1
75 TABLET, DELAYED RELEASE ORAL 2 TIMES DAILY
Qty: 60 TABLET | Refills: 0 | Status: SHIPPED | OUTPATIENT
Start: 2025-09-04

## 2025-09-05 ENCOUNTER — TELEPHONE (OUTPATIENT)
Age: 40
End: 2025-09-05

## 2025-09-05 ENCOUNTER — HOSPITAL ENCOUNTER (OUTPATIENT)
Dept: VASCULAR LAB | Age: 40
Discharge: HOME | End: 2025-09-05
Attending: ORTHOPAEDIC SURGERY
Payer: OTHER MISCELLANEOUS

## 2025-09-05 ENCOUNTER — TELEPHONE (OUTPATIENT)
Dept: ORTHOPEDIC SURGERY | Age: 40
End: 2025-09-05

## 2025-09-05 DIAGNOSIS — M79.605 PAIN AND SWELLING OF LEFT LOWER EXTREMITY: ICD-10-CM

## 2025-09-05 DIAGNOSIS — S93.402D MODERATE LEFT ANKLE SPRAIN, SUBSEQUENT ENCOUNTER: Primary | ICD-10-CM

## 2025-09-05 DIAGNOSIS — M79.89 PAIN AND SWELLING OF LEFT LOWER EXTREMITY: ICD-10-CM

## 2025-09-05 PROCEDURE — 93971 EXTREMITY STUDY: CPT
